# Patient Record
Sex: MALE | Race: WHITE | NOT HISPANIC OR LATINO | Employment: FULL TIME | URBAN - METROPOLITAN AREA
[De-identification: names, ages, dates, MRNs, and addresses within clinical notes are randomized per-mention and may not be internally consistent; named-entity substitution may affect disease eponyms.]

---

## 2021-06-22 ENCOUNTER — HOSPITAL ENCOUNTER (OUTPATIENT)
Facility: HOSPITAL | Age: 56
Discharge: HOME | End: 2021-06-23
Attending: EMERGENCY MEDICINE | Admitting: INTERNAL MEDICINE

## 2021-06-22 DIAGNOSIS — G45.9 TIA (TRANSIENT ISCHEMIC ATTACK): Primary | ICD-10-CM

## 2021-06-22 DIAGNOSIS — R20.2 PARESTHESIA: ICD-10-CM

## 2021-06-22 DIAGNOSIS — I63.9 STROKE: ICD-10-CM

## 2021-06-22 LAB
ALBUMIN SERPL BCP-MCNC: 3.7 G/DL (ref 3.5–5.2)
ALP SERPL-CCNC: 85 U/L (ref 55–135)
ALT SERPL W/O P-5'-P-CCNC: 9 U/L (ref 10–44)
ANION GAP SERPL CALC-SCNC: 10 MMOL/L (ref 8–16)
APTT PPP: 28.5 SEC (ref 25.6–35.8)
AST SERPL-CCNC: 16 U/L (ref 10–40)
BASOPHILS # BLD AUTO: 0.02 K/UL (ref 0–0.2)
BASOPHILS NFR BLD: 0.3 % (ref 0–1.9)
BILIRUB SERPL-MCNC: 0.7 MG/DL (ref 0.1–1)
BNP SERPL-MCNC: 137 PG/ML (ref 0–99)
BUN SERPL-MCNC: 14 MG/DL (ref 6–20)
CALCIUM SERPL-MCNC: 8 MG/DL (ref 8.7–10.5)
CHLORIDE SERPL-SCNC: 102 MMOL/L (ref 95–110)
CHOLEST SERPL-MCNC: 134 MG/DL (ref 120–199)
CHOLEST/HDLC SERPL: 4.6 {RATIO} (ref 2–5)
CO2 SERPL-SCNC: 26 MMOL/L (ref 23–29)
CREAT SERPL-MCNC: 1.1 MG/DL (ref 0.5–1.4)
CREAT SERPL-MCNC: 1.2 MG/DL (ref 0.5–1.4)
DIFFERENTIAL METHOD: ABNORMAL
EOSINOPHIL # BLD AUTO: 0.2 K/UL (ref 0–0.5)
EOSINOPHIL NFR BLD: 3.4 % (ref 0–8)
ERYTHROCYTE [DISTWIDTH] IN BLOOD BY AUTOMATED COUNT: 12.8 % (ref 11.5–14.5)
EST. GFR  (AFRICAN AMERICAN): >60 ML/MIN/1.73 M^2
EST. GFR  (NON AFRICAN AMERICAN): >60 ML/MIN/1.73 M^2
FOLATE SERPL-MCNC: 6 NG/ML (ref 4–24)
GLUCOSE SERPL-MCNC: 82 MG/DL (ref 70–110)
GLUCOSE SERPL-MCNC: 89 MG/DL (ref 70–110)
HCT VFR BLD AUTO: 41.2 % (ref 40–54)
HDLC SERPL-MCNC: 29 MG/DL (ref 40–75)
HDLC SERPL: 21.6 % (ref 20–50)
HGB BLD-MCNC: 13.8 G/DL (ref 14–18)
IMM GRANULOCYTES # BLD AUTO: 0.02 K/UL (ref 0–0.04)
IMM GRANULOCYTES NFR BLD AUTO: 0.3 % (ref 0–0.5)
INR PPP: 1
LDLC SERPL CALC-MCNC: 82.4 MG/DL (ref 63–159)
LYMPHOCYTES # BLD AUTO: 1.2 K/UL (ref 1–4.8)
LYMPHOCYTES NFR BLD: 17.1 % (ref 18–48)
MCH RBC QN AUTO: 29.4 PG (ref 27–31)
MCHC RBC AUTO-ENTMCNC: 33.5 G/DL (ref 32–36)
MCV RBC AUTO: 88 FL (ref 82–98)
MONOCYTES # BLD AUTO: 0.3 K/UL (ref 0.3–1)
MONOCYTES NFR BLD: 5.1 % (ref 4–15)
NEUTROPHILS # BLD AUTO: 5 K/UL (ref 1.8–7.7)
NEUTROPHILS NFR BLD: 73.8 % (ref 38–73)
NONHDLC SERPL-MCNC: 105 MG/DL
NRBC BLD-RTO: 0 /100 WBC
PLATELET # BLD AUTO: 155 K/UL (ref 150–450)
PMV BLD AUTO: 8.7 FL (ref 9.2–12.9)
POTASSIUM SERPL-SCNC: 3.8 MMOL/L (ref 3.5–5.1)
PROT SERPL-MCNC: 7.2 G/DL (ref 6–8.4)
PROTHROMBIN TIME: 12.8 SEC (ref 11.8–14.3)
RBC # BLD AUTO: 4.69 M/UL (ref 4.6–6.2)
SAMPLE: NORMAL
SARS-COV-2 RDRP RESP QL NAA+PROBE: NEGATIVE
SODIUM SERPL-SCNC: 138 MMOL/L (ref 136–145)
TRIGL SERPL-MCNC: 113 MG/DL (ref 30–150)
TROPONIN I SERPL DL<=0.01 NG/ML-MCNC: <0.03 NG/ML
TROPONIN I SERPL DL<=0.01 NG/ML-MCNC: <0.03 NG/ML
TSH SERPL DL<=0.005 MIU/L-ACNC: 1.54 UIU/ML (ref 0.34–5.6)
VIT B12 SERPL-MCNC: 91 PG/ML (ref 210–950)
WBC # BLD AUTO: 6.71 K/UL (ref 3.9–12.7)

## 2021-06-22 PROCEDURE — 84443 ASSAY THYROID STIM HORMONE: CPT | Performed by: EMERGENCY MEDICINE

## 2021-06-22 PROCEDURE — 36415 COLL VENOUS BLD VENIPUNCTURE: CPT | Performed by: NURSE PRACTITIONER

## 2021-06-22 PROCEDURE — 25000003 PHARM REV CODE 250: Performed by: INTERNAL MEDICINE

## 2021-06-22 PROCEDURE — 82607 VITAMIN B-12: CPT | Performed by: NURSE PRACTITIONER

## 2021-06-22 PROCEDURE — 80061 LIPID PANEL: CPT | Performed by: EMERGENCY MEDICINE

## 2021-06-22 PROCEDURE — G0378 HOSPITAL OBSERVATION PER HR: HCPCS

## 2021-06-22 PROCEDURE — 96375 TX/PRO/DX INJ NEW DRUG ADDON: CPT

## 2021-06-22 PROCEDURE — 84484 ASSAY OF TROPONIN QUANT: CPT | Mod: 91 | Performed by: EMERGENCY MEDICINE

## 2021-06-22 PROCEDURE — U0002 COVID-19 LAB TEST NON-CDC: HCPCS | Performed by: EMERGENCY MEDICINE

## 2021-06-22 PROCEDURE — 93010 EKG 12-LEAD: ICD-10-PCS | Mod: ,,, | Performed by: INTERNAL MEDICINE

## 2021-06-22 PROCEDURE — 99285 EMERGENCY DEPT VISIT HI MDM: CPT | Mod: 25

## 2021-06-22 PROCEDURE — 85610 PROTHROMBIN TIME: CPT | Performed by: EMERGENCY MEDICINE

## 2021-06-22 PROCEDURE — 63600175 PHARM REV CODE 636 W HCPCS: Performed by: NURSE PRACTITIONER

## 2021-06-22 PROCEDURE — 25000003 PHARM REV CODE 250: Performed by: EMERGENCY MEDICINE

## 2021-06-22 PROCEDURE — 93005 ELECTROCARDIOGRAM TRACING: CPT | Performed by: INTERNAL MEDICINE

## 2021-06-22 PROCEDURE — 84484 ASSAY OF TROPONIN QUANT: CPT | Performed by: NURSE PRACTITIONER

## 2021-06-22 PROCEDURE — 80053 COMPREHEN METABOLIC PANEL: CPT | Performed by: EMERGENCY MEDICINE

## 2021-06-22 PROCEDURE — 85025 COMPLETE CBC W/AUTO DIFF WBC: CPT | Performed by: EMERGENCY MEDICINE

## 2021-06-22 PROCEDURE — 82746 ASSAY OF FOLIC ACID SERUM: CPT | Performed by: NURSE PRACTITIONER

## 2021-06-22 PROCEDURE — 85730 THROMBOPLASTIN TIME PARTIAL: CPT | Performed by: EMERGENCY MEDICINE

## 2021-06-22 PROCEDURE — 96374 THER/PROPH/DIAG INJ IV PUSH: CPT

## 2021-06-22 PROCEDURE — 82962 GLUCOSE BLOOD TEST: CPT

## 2021-06-22 PROCEDURE — 83880 ASSAY OF NATRIURETIC PEPTIDE: CPT | Performed by: EMERGENCY MEDICINE

## 2021-06-22 PROCEDURE — 93010 ELECTROCARDIOGRAM REPORT: CPT | Mod: ,,, | Performed by: INTERNAL MEDICINE

## 2021-06-22 PROCEDURE — 25500020 PHARM REV CODE 255: Performed by: EMERGENCY MEDICINE

## 2021-06-22 RX ORDER — AMOXICILLIN 250 MG
2 CAPSULE ORAL DAILY PRN
Status: DISCONTINUED | OUTPATIENT
Start: 2021-06-22 | End: 2021-06-23 | Stop reason: HOSPADM

## 2021-06-22 RX ORDER — ONDANSETRON 2 MG/ML
4 INJECTION INTRAMUSCULAR; INTRAVENOUS EVERY 6 HOURS PRN
Status: DISCONTINUED | OUTPATIENT
Start: 2021-06-22 | End: 2021-06-23 | Stop reason: HOSPADM

## 2021-06-22 RX ORDER — POTASSIUM CHLORIDE 7.45 MG/ML
20 INJECTION INTRAVENOUS
Status: DISCONTINUED | OUTPATIENT
Start: 2021-06-22 | End: 2021-06-23 | Stop reason: HOSPADM

## 2021-06-22 RX ORDER — MAGNESIUM SULFATE HEPTAHYDRATE 40 MG/ML
4 INJECTION, SOLUTION INTRAVENOUS
Status: DISCONTINUED | OUTPATIENT
Start: 2021-06-22 | End: 2021-06-23 | Stop reason: HOSPADM

## 2021-06-22 RX ORDER — POTASSIUM CHLORIDE 20 MEQ/1
20 TABLET, EXTENDED RELEASE ORAL
Status: DISCONTINUED | OUTPATIENT
Start: 2021-06-22 | End: 2021-06-23 | Stop reason: HOSPADM

## 2021-06-22 RX ORDER — LABETALOL HYDROCHLORIDE 5 MG/ML
10 INJECTION, SOLUTION INTRAVENOUS
Status: COMPLETED | OUTPATIENT
Start: 2021-06-22 | End: 2021-06-22

## 2021-06-22 RX ORDER — SODIUM CHLORIDE 0.9 % (FLUSH) 0.9 %
10 SYRINGE (ML) INJECTION
Status: DISCONTINUED | OUTPATIENT
Start: 2021-06-22 | End: 2021-06-23 | Stop reason: HOSPADM

## 2021-06-22 RX ORDER — ATORVASTATIN CALCIUM 20 MG/1
40 TABLET, FILM COATED ORAL DAILY
Status: DISCONTINUED | OUTPATIENT
Start: 2021-06-23 | End: 2021-06-22

## 2021-06-22 RX ORDER — AMLODIPINE BESYLATE 5 MG/1
5 TABLET ORAL DAILY
Status: DISCONTINUED | OUTPATIENT
Start: 2021-06-22 | End: 2021-06-22

## 2021-06-22 RX ORDER — POTASSIUM CHLORIDE 20 MEQ/1
40 TABLET, EXTENDED RELEASE ORAL
Status: DISCONTINUED | OUTPATIENT
Start: 2021-06-22 | End: 2021-06-23 | Stop reason: HOSPADM

## 2021-06-22 RX ORDER — MAGNESIUM SULFATE HEPTAHYDRATE 40 MG/ML
2 INJECTION, SOLUTION INTRAVENOUS
Status: DISCONTINUED | OUTPATIENT
Start: 2021-06-22 | End: 2021-06-23 | Stop reason: HOSPADM

## 2021-06-22 RX ORDER — LANOLIN ALCOHOL/MO/W.PET/CERES
800 CREAM (GRAM) TOPICAL
Status: DISCONTINUED | OUTPATIENT
Start: 2021-06-22 | End: 2021-06-23 | Stop reason: HOSPADM

## 2021-06-22 RX ORDER — HYDRALAZINE HYDROCHLORIDE 20 MG/ML
10 INJECTION INTRAMUSCULAR; INTRAVENOUS EVERY 6 HOURS PRN
Status: DISCONTINUED | OUTPATIENT
Start: 2021-06-22 | End: 2021-06-23 | Stop reason: HOSPADM

## 2021-06-22 RX ORDER — MAGNESIUM SULFATE 1 G/100ML
1 INJECTION INTRAVENOUS
Status: DISCONTINUED | OUTPATIENT
Start: 2021-06-22 | End: 2021-06-23 | Stop reason: HOSPADM

## 2021-06-22 RX ORDER — ASPIRIN 325 MG
325 TABLET ORAL
Status: COMPLETED | OUTPATIENT
Start: 2021-06-22 | End: 2021-06-22

## 2021-06-22 RX ORDER — POTASSIUM CHLORIDE 7.45 MG/ML
40 INJECTION INTRAVENOUS
Status: DISCONTINUED | OUTPATIENT
Start: 2021-06-22 | End: 2021-06-23 | Stop reason: HOSPADM

## 2021-06-22 RX ORDER — NAPROXEN SODIUM 220 MG/1
81 TABLET, FILM COATED ORAL DAILY
Status: DISCONTINUED | OUTPATIENT
Start: 2021-06-23 | End: 2021-06-23 | Stop reason: HOSPADM

## 2021-06-22 RX ORDER — ATORVASTATIN CALCIUM 40 MG/1
40 TABLET, FILM COATED ORAL NIGHTLY
Status: DISCONTINUED | OUTPATIENT
Start: 2021-06-22 | End: 2021-06-23 | Stop reason: HOSPADM

## 2021-06-22 RX ORDER — ACETAMINOPHEN 325 MG/1
650 TABLET ORAL EVERY 6 HOURS PRN
Status: DISCONTINUED | OUTPATIENT
Start: 2021-06-22 | End: 2021-06-23 | Stop reason: HOSPADM

## 2021-06-22 RX ADMIN — ASPIRIN 325 MG ORAL TABLET 325 MG: 325 PILL ORAL at 06:06

## 2021-06-22 RX ADMIN — ATORVASTATIN CALCIUM 40 MG: 40 TABLET, FILM COATED ORAL at 09:06

## 2021-06-22 RX ADMIN — LABETALOL HYDROCHLORIDE 10 MG: 5 INJECTION, SOLUTION INTRAVENOUS at 06:06

## 2021-06-22 RX ADMIN — IOHEXOL 100 ML: 350 INJECTION, SOLUTION INTRAVENOUS at 04:06

## 2021-06-22 RX ADMIN — AMLODIPINE BESYLATE 5 MG: 5 TABLET ORAL at 09:06

## 2021-06-22 RX ADMIN — ONDANSETRON 4 MG: 2 INJECTION INTRAMUSCULAR; INTRAVENOUS at 08:06

## 2021-06-22 RX ADMIN — HYDRALAZINE HYDROCHLORIDE 10 MG: 20 INJECTION INTRAMUSCULAR; INTRAVENOUS at 08:06

## 2021-06-23 ENCOUNTER — CLINICAL SUPPORT (OUTPATIENT)
Dept: CARDIOLOGY | Facility: HOSPITAL | Age: 56
End: 2021-06-23
Attending: EMERGENCY MEDICINE

## 2021-06-23 VITALS
BODY MASS INDEX: 38.43 KG/M2 | OXYGEN SATURATION: 98 % | HEART RATE: 61 BPM | WEIGHT: 309.06 LBS | DIASTOLIC BLOOD PRESSURE: 69 MMHG | SYSTOLIC BLOOD PRESSURE: 165 MMHG | TEMPERATURE: 98 F | RESPIRATION RATE: 19 BRPM | HEIGHT: 75 IN

## 2021-06-23 LAB
ALBUMIN SERPL BCP-MCNC: 3.5 G/DL (ref 3.5–5.2)
ALP SERPL-CCNC: 86 U/L (ref 55–135)
ALT SERPL W/O P-5'-P-CCNC: 6 U/L (ref 10–44)
ANION GAP SERPL CALC-SCNC: 10 MMOL/L (ref 8–16)
AST SERPL-CCNC: 14 U/L (ref 10–40)
BASOPHILS # BLD AUTO: 0.03 K/UL (ref 0–0.2)
BASOPHILS NFR BLD: 0.4 % (ref 0–1.9)
BILIRUB SERPL-MCNC: 0.7 MG/DL (ref 0.1–1)
BUN SERPL-MCNC: 13 MG/DL (ref 6–20)
CALCIUM SERPL-MCNC: 7.9 MG/DL (ref 8.7–10.5)
CHLORIDE SERPL-SCNC: 102 MMOL/L (ref 95–110)
CO2 SERPL-SCNC: 28 MMOL/L (ref 23–29)
CREAT SERPL-MCNC: 1.2 MG/DL (ref 0.5–1.4)
DIFFERENTIAL METHOD: ABNORMAL
EOSINOPHIL # BLD AUTO: 0.2 K/UL (ref 0–0.5)
EOSINOPHIL NFR BLD: 3.5 % (ref 0–8)
ERYTHROCYTE [DISTWIDTH] IN BLOOD BY AUTOMATED COUNT: 12.7 % (ref 11.5–14.5)
EST. GFR  (AFRICAN AMERICAN): >60 ML/MIN/1.73 M^2
EST. GFR  (NON AFRICAN AMERICAN): >60 ML/MIN/1.73 M^2
ESTIMATED AVG GLUCOSE: 111 MG/DL (ref 68–131)
GLUCOSE SERPL-MCNC: 89 MG/DL (ref 70–110)
HBA1C MFR BLD: 5.5 % (ref 4.5–6.2)
HCT VFR BLD AUTO: 42.3 % (ref 40–54)
HGB BLD-MCNC: 14.1 G/DL (ref 14–18)
IMM GRANULOCYTES # BLD AUTO: 0.02 K/UL (ref 0–0.04)
IMM GRANULOCYTES NFR BLD AUTO: 0.3 % (ref 0–0.5)
LYMPHOCYTES # BLD AUTO: 1.7 K/UL (ref 1–4.8)
LYMPHOCYTES NFR BLD: 24.7 % (ref 18–48)
MAGNESIUM SERPL-MCNC: 2.1 MG/DL (ref 1.6–2.6)
MCH RBC QN AUTO: 29.4 PG (ref 27–31)
MCHC RBC AUTO-ENTMCNC: 33.3 G/DL (ref 32–36)
MCV RBC AUTO: 88 FL (ref 82–98)
MONOCYTES # BLD AUTO: 0.4 K/UL (ref 0.3–1)
MONOCYTES NFR BLD: 5.2 % (ref 4–15)
NEUTROPHILS # BLD AUTO: 4.6 K/UL (ref 1.8–7.7)
NEUTROPHILS NFR BLD: 65.9 % (ref 38–73)
NRBC BLD-RTO: 0 /100 WBC
PHOSPHATE SERPL-MCNC: 3.6 MG/DL (ref 2.7–4.5)
PLATELET # BLD AUTO: 157 K/UL (ref 150–450)
PMV BLD AUTO: 8.7 FL (ref 9.2–12.9)
POTASSIUM SERPL-SCNC: 3.6 MMOL/L (ref 3.5–5.1)
PROT SERPL-MCNC: 7.1 G/DL (ref 6–8.4)
RBC # BLD AUTO: 4.8 M/UL (ref 4.6–6.2)
SODIUM SERPL-SCNC: 140 MMOL/L (ref 136–145)
TROPONIN I SERPL DL<=0.01 NG/ML-MCNC: <0.03 NG/ML
WBC # BLD AUTO: 6.93 K/UL (ref 3.9–12.7)

## 2021-06-23 PROCEDURE — 84100 ASSAY OF PHOSPHORUS: CPT | Performed by: NURSE PRACTITIONER

## 2021-06-23 PROCEDURE — 93306 TTE W/DOPPLER COMPLETE: CPT | Mod: 26,,, | Performed by: INTERNAL MEDICINE

## 2021-06-23 PROCEDURE — 36415 COLL VENOUS BLD VENIPUNCTURE: CPT | Performed by: NURSE PRACTITIONER

## 2021-06-23 PROCEDURE — 63600175 PHARM REV CODE 636 W HCPCS: Performed by: INTERNAL MEDICINE

## 2021-06-23 PROCEDURE — 96372 THER/PROPH/DIAG INJ SC/IM: CPT

## 2021-06-23 PROCEDURE — 83036 HEMOGLOBIN GLYCOSYLATED A1C: CPT | Performed by: NURSE PRACTITIONER

## 2021-06-23 PROCEDURE — 92610 EVALUATE SWALLOWING FUNCTION: CPT

## 2021-06-23 PROCEDURE — 63600175 PHARM REV CODE 636 W HCPCS: Performed by: NURSE PRACTITIONER

## 2021-06-23 PROCEDURE — 84484 ASSAY OF TROPONIN QUANT: CPT | Performed by: NURSE PRACTITIONER

## 2021-06-23 PROCEDURE — 25000003 PHARM REV CODE 250: Performed by: NURSE PRACTITIONER

## 2021-06-23 PROCEDURE — 93306 TTE W/DOPPLER COMPLETE: CPT

## 2021-06-23 PROCEDURE — G0378 HOSPITAL OBSERVATION PER HR: HCPCS

## 2021-06-23 PROCEDURE — 25000003 PHARM REV CODE 250: Performed by: INTERNAL MEDICINE

## 2021-06-23 PROCEDURE — 96376 TX/PRO/DX INJ SAME DRUG ADON: CPT

## 2021-06-23 PROCEDURE — 93306 ECHO (CUPID ONLY): ICD-10-PCS | Mod: 26,,, | Performed by: INTERNAL MEDICINE

## 2021-06-23 PROCEDURE — 85025 COMPLETE CBC W/AUTO DIFF WBC: CPT | Performed by: NURSE PRACTITIONER

## 2021-06-23 PROCEDURE — 80053 COMPREHEN METABOLIC PANEL: CPT | Performed by: NURSE PRACTITIONER

## 2021-06-23 PROCEDURE — 83735 ASSAY OF MAGNESIUM: CPT | Performed by: NURSE PRACTITIONER

## 2021-06-23 PROCEDURE — 97161 PT EVAL LOW COMPLEX 20 MIN: CPT

## 2021-06-23 RX ORDER — ATORVASTATIN CALCIUM 40 MG/1
40 TABLET, FILM COATED ORAL NIGHTLY
Qty: 90 TABLET | Refills: 0 | Status: SHIPPED | OUTPATIENT
Start: 2021-06-23 | End: 2022-06-23

## 2021-06-23 RX ORDER — AMLODIPINE BESYLATE 5 MG/1
10 TABLET ORAL DAILY
Status: DISCONTINUED | OUTPATIENT
Start: 2021-06-23 | End: 2021-06-23 | Stop reason: HOSPADM

## 2021-06-23 RX ORDER — CLONIDINE HYDROCHLORIDE 0.1 MG/1
0.1 TABLET ORAL ONCE
Status: COMPLETED | OUTPATIENT
Start: 2021-06-23 | End: 2021-06-23

## 2021-06-23 RX ORDER — AMLODIPINE BESYLATE 10 MG/1
10 TABLET ORAL DAILY
Qty: 30 TABLET | Refills: 3 | Status: SHIPPED | OUTPATIENT
Start: 2021-06-23 | End: 2021-10-21

## 2021-06-23 RX ORDER — CYANOCOBALAMIN 1000 UG/ML
1000 INJECTION, SOLUTION INTRAMUSCULAR; SUBCUTANEOUS ONCE
Status: COMPLETED | OUTPATIENT
Start: 2021-06-23 | End: 2021-06-23

## 2021-06-23 RX ORDER — NAPROXEN SODIUM 220 MG/1
81 TABLET, FILM COATED ORAL DAILY
Qty: 30 TABLET | Refills: 3 | Status: SHIPPED | OUTPATIENT
Start: 2021-06-23 | End: 2021-10-21

## 2021-06-23 RX ORDER — AMLODIPINE BESYLATE 5 MG/1
5 TABLET ORAL DAILY
Status: DISCONTINUED | OUTPATIENT
Start: 2021-06-23 | End: 2021-06-23

## 2021-06-23 RX ADMIN — POTASSIUM CHLORIDE 20 MEQ: 20 TABLET, EXTENDED RELEASE ORAL at 06:06

## 2021-06-23 RX ADMIN — CYANOCOBALAMIN 1000 MCG: 1000 INJECTION, SOLUTION INTRAMUSCULAR at 08:06

## 2021-06-23 RX ADMIN — HYDRALAZINE HYDROCHLORIDE 10 MG: 20 INJECTION INTRAMUSCULAR; INTRAVENOUS at 08:06

## 2021-06-23 RX ADMIN — CLONIDINE HYDROCHLORIDE 0.1 MG: 0.1 TABLET ORAL at 01:06

## 2021-06-23 RX ADMIN — ASPIRIN 81 MG: 81 TABLET, CHEWABLE ORAL at 08:06

## 2021-06-23 RX ADMIN — AMLODIPINE BESYLATE 10 MG: 5 TABLET ORAL at 01:06

## 2021-06-24 LAB
AORTIC ROOT ANNULUS: 3.21 CM
AORTIC VALVE CUSP SEPERATION: 2.11 CM
AV INDEX (PROSTH): 0.78
AV MEAN GRADIENT: 5 MMHG
AV PEAK GRADIENT: 10 MMHG
AV VALVE AREA: 3.48 CM2
AV VELOCITY RATIO: 73.64
BSA FOR ECHO PROCEDURE: 2.72 M2
CV ECHO LV RWT: 0.51 CM
DOP CALC AO PEAK VEL: 1.6 M/S
DOP CALC AO VTI: 34.8 CM
DOP CALC LVOT AREA: 4.4 CM2
DOP CALC LVOT DIAMETER: 2.38 CM
DOP CALC LVOT PEAK VEL: 117.82 M/S
DOP CALC LVOT STROKE VOLUME: 121.17 CM3
DOP CALCLVOT PEAK VEL VTI: 27.25 CM
E WAVE DECELERATION TIME: 281.78 MSEC
E/A RATIO: 1.06
E/E' RATIO: 12.5 M/S
ECHO LV POSTERIOR WALL: 1.28 CM (ref 0.6–1.1)
EJECTION FRACTION: 75 %
FRACTIONAL SHORTENING: 45 % (ref 28–44)
INTERVENTRICULAR SEPTUM: 1.11 CM (ref 0.6–1.1)
LEFT ATRIUM SIZE: 4.04 CM
LEFT INTERNAL DIMENSION IN SYSTOLE: 2.79 CM (ref 2.1–4)
LEFT VENTRICLE MASS INDEX: 90 G/M2
LEFT VENTRICULAR INTERNAL DIMENSION IN DIASTOLE: 5.06 CM (ref 3.5–6)
LEFT VENTRICULAR MASS: 236.84 G
LV LATERAL E/E' RATIO: 15 M/S
LV SEPTAL E/E' RATIO: 10.71 M/S
MV PEAK A VEL: 0.71 M/S
MV PEAK E VEL: 0.75 M/S
PV PEAK VELOCITY: 97.78 CM/S
RA PRESSURE: 3 MMHG
RIGHT VENTRICULAR END-DIASTOLIC DIMENSION: 404 CM
TDI LATERAL: 0.05 M/S
TDI SEPTAL: 0.07 M/S
TDI: 0.06 M/S

## 2021-12-09 ENCOUNTER — HOSPITAL ENCOUNTER (OUTPATIENT)
Facility: HOSPITAL | Age: 56
Discharge: HOME | End: 2021-12-10
Attending: EMERGENCY MEDICINE | Admitting: STUDENT IN AN ORGANIZED HEALTH CARE EDUCATION/TRAINING PROGRAM

## 2021-12-09 ENCOUNTER — ANESTHESIA EVENT (OUTPATIENT)
Dept: SURGERY | Facility: HOSPITAL | Age: 56
End: 2021-12-09

## 2021-12-09 ENCOUNTER — ANESTHESIA (OUTPATIENT)
Dept: SURGERY | Facility: HOSPITAL | Age: 56
End: 2021-12-09

## 2021-12-09 DIAGNOSIS — L02.91 CUTANEOUS ABSCESS, UNSPECIFIED SITE: ICD-10-CM

## 2021-12-09 DIAGNOSIS — R60.9 SWELLING: ICD-10-CM

## 2021-12-09 DIAGNOSIS — L02.415 ABSCESS OF RIGHT THIGH: Primary | ICD-10-CM

## 2021-12-09 PROBLEM — L02.419 ABSCESS OF LEG: Status: ACTIVE | Noted: 2021-12-09

## 2021-12-09 LAB
ALBUMIN SERPL BCP-MCNC: 3.1 G/DL (ref 3.5–5.2)
ALP SERPL-CCNC: 88 U/L (ref 55–135)
ALT SERPL W/O P-5'-P-CCNC: 9 U/L (ref 10–44)
ANION GAP SERPL CALC-SCNC: 12 MMOL/L (ref 8–16)
AST SERPL-CCNC: 15 U/L (ref 10–40)
BASOPHILS # BLD AUTO: 0.02 K/UL (ref 0–0.2)
BASOPHILS NFR BLD: 0.2 % (ref 0–1.9)
BILIRUB SERPL-MCNC: 0.8 MG/DL (ref 0.1–1)
BUN SERPL-MCNC: 14 MG/DL (ref 6–20)
CALCIUM SERPL-MCNC: 8 MG/DL (ref 8.7–10.5)
CHLORIDE SERPL-SCNC: 95 MMOL/L (ref 95–110)
CO2 SERPL-SCNC: 23 MMOL/L (ref 23–29)
CREAT SERPL-MCNC: 1.4 MG/DL (ref 0.5–1.4)
CRP SERPL-MCNC: >38 MG/DL
DIFFERENTIAL METHOD: ABNORMAL
EOSINOPHIL # BLD AUTO: 0.1 K/UL (ref 0–0.5)
EOSINOPHIL NFR BLD: 0.9 % (ref 0–8)
ERYTHROCYTE [DISTWIDTH] IN BLOOD BY AUTOMATED COUNT: 13.5 % (ref 11.5–14.5)
ERYTHROCYTE [SEDIMENTATION RATE] IN BLOOD BY WESTERGREN METHOD: 62 MM/HR (ref 0–10)
EST. GFR  (AFRICAN AMERICAN): >60 ML/MIN/1.73 M^2
EST. GFR  (NON AFRICAN AMERICAN): 55.8 ML/MIN/1.73 M^2
GLUCOSE SERPL-MCNC: 95 MG/DL (ref 70–110)
HCT VFR BLD AUTO: 41.9 % (ref 40–54)
HGB BLD-MCNC: 14 G/DL (ref 14–18)
IMM GRANULOCYTES # BLD AUTO: 0.1 K/UL (ref 0–0.04)
IMM GRANULOCYTES NFR BLD AUTO: 1.1 % (ref 0–0.5)
LACTATE SERPL-SCNC: 1.7 MMOL/L (ref 0.5–1.9)
LYMPHOCYTES # BLD AUTO: 0.5 K/UL (ref 1–4.8)
LYMPHOCYTES NFR BLD: 5.4 % (ref 18–48)
MCH RBC QN AUTO: 29 PG (ref 27–31)
MCHC RBC AUTO-ENTMCNC: 33.4 G/DL (ref 32–36)
MCV RBC AUTO: 87 FL (ref 82–98)
MONOCYTES # BLD AUTO: 0.4 K/UL (ref 0.3–1)
MONOCYTES NFR BLD: 4.1 % (ref 4–15)
NEUTROPHILS # BLD AUTO: 8.4 K/UL (ref 1.8–7.7)
NEUTROPHILS NFR BLD: 88.3 % (ref 38–73)
NRBC BLD-RTO: 0 /100 WBC
PLATELET # BLD AUTO: 275 K/UL (ref 150–450)
PMV BLD AUTO: 11.6 FL (ref 9.2–12.9)
POTASSIUM SERPL-SCNC: 4 MMOL/L (ref 3.5–5.1)
PROT SERPL-MCNC: 7.9 G/DL (ref 6–8.4)
RBC # BLD AUTO: 4.82 M/UL (ref 4.6–6.2)
SARS-COV-2 RDRP RESP QL NAA+PROBE: POSITIVE
SODIUM SERPL-SCNC: 130 MMOL/L (ref 136–145)
WBC # BLD AUTO: 9.5 K/UL (ref 3.9–12.7)

## 2021-12-09 PROCEDURE — 25000003 PHARM REV CODE 250: Performed by: EMERGENCY MEDICINE

## 2021-12-09 PROCEDURE — 63600175 PHARM REV CODE 636 W HCPCS: Performed by: EMERGENCY MEDICINE

## 2021-12-09 PROCEDURE — 87147 CULTURE TYPE IMMUNOLOGIC: CPT | Mod: 59 | Performed by: EMERGENCY MEDICINE

## 2021-12-09 PROCEDURE — 96361 HYDRATE IV INFUSION ADD-ON: CPT

## 2021-12-09 PROCEDURE — 99900035 HC TECH TIME PER 15 MIN (STAT)

## 2021-12-09 PROCEDURE — 87070 CULTURE OTHR SPECIMN AEROBIC: CPT | Performed by: EMERGENCY MEDICINE

## 2021-12-09 PROCEDURE — 10061 PR DRAIN SKIN ABSCESS COMPLIC: ICD-10-PCS | Mod: ,,, | Performed by: SURGERY

## 2021-12-09 PROCEDURE — 86140 C-REACTIVE PROTEIN: CPT | Performed by: NURSE PRACTITIONER

## 2021-12-09 PROCEDURE — G0378 HOSPITAL OBSERVATION PER HR: HCPCS

## 2021-12-09 PROCEDURE — 87186 SC STD MICRODIL/AGAR DIL: CPT | Performed by: EMERGENCY MEDICINE

## 2021-12-09 PROCEDURE — 85025 COMPLETE CBC W/AUTO DIFF WBC: CPT | Performed by: NURSE PRACTITIONER

## 2021-12-09 PROCEDURE — 25000003 PHARM REV CODE 250: Performed by: STUDENT IN AN ORGANIZED HEALTH CARE EDUCATION/TRAINING PROGRAM

## 2021-12-09 PROCEDURE — 87040 BLOOD CULTURE FOR BACTERIA: CPT | Performed by: NURSE PRACTITIONER

## 2021-12-09 PROCEDURE — 63600175 PHARM REV CODE 636 W HCPCS: Performed by: STUDENT IN AN ORGANIZED HEALTH CARE EDUCATION/TRAINING PROGRAM

## 2021-12-09 PROCEDURE — 94761 N-INVAS EAR/PLS OXIMETRY MLT: CPT

## 2021-12-09 PROCEDURE — 99285 EMERGENCY DEPT VISIT HI MDM: CPT | Mod: 25

## 2021-12-09 PROCEDURE — 71000033 HC RECOVERY, INTIAL HOUR: Performed by: SURGERY

## 2021-12-09 PROCEDURE — 94799 UNLISTED PULMONARY SVC/PX: CPT

## 2021-12-09 PROCEDURE — 25000003 PHARM REV CODE 250: Performed by: SURGERY

## 2021-12-09 PROCEDURE — M0243 CASIRIVI AND IMDEVI INFUSION: HCPCS | Performed by: EMERGENCY MEDICINE

## 2021-12-09 PROCEDURE — 37000009 HC ANESTHESIA EA ADD 15 MINS: Performed by: SURGERY

## 2021-12-09 PROCEDURE — 37000008 HC ANESTHESIA 1ST 15 MINUTES: Performed by: SURGERY

## 2021-12-09 PROCEDURE — 10061 I&D ABSCESS COMP/MULTIPLE: CPT | Mod: ,,, | Performed by: SURGERY

## 2021-12-09 PROCEDURE — 83605 ASSAY OF LACTIC ACID: CPT | Performed by: NURSE PRACTITIONER

## 2021-12-09 PROCEDURE — 96376 TX/PRO/DX INJ SAME DRUG ADON: CPT

## 2021-12-09 PROCEDURE — 96365 THER/PROPH/DIAG IV INF INIT: CPT

## 2021-12-09 PROCEDURE — 85651 RBC SED RATE NONAUTOMATED: CPT | Performed by: NURSE PRACTITIONER

## 2021-12-09 PROCEDURE — 80053 COMPREHEN METABOLIC PANEL: CPT | Performed by: NURSE PRACTITIONER

## 2021-12-09 PROCEDURE — 96367 TX/PROPH/DG ADDL SEQ IV INF: CPT

## 2021-12-09 PROCEDURE — 36000704 HC OR TIME LEV I 1ST 15 MIN: Performed by: SURGERY

## 2021-12-09 PROCEDURE — 25000003 PHARM REV CODE 250: Performed by: NURSE PRACTITIONER

## 2021-12-09 PROCEDURE — 27000080 OPTIME MED/SURG SUP & DEVICES GENERAL CLASSIFICATION: Performed by: SURGERY

## 2021-12-09 PROCEDURE — 99203 OFFICE O/P NEW LOW 30 MIN: CPT | Mod: 25,,, | Performed by: SURGERY

## 2021-12-09 PROCEDURE — 36000705 HC OR TIME LEV I EA ADD 15 MIN: Performed by: SURGERY

## 2021-12-09 PROCEDURE — 87075 CULTR BACTERIA EXCEPT BLOOD: CPT | Performed by: EMERGENCY MEDICINE

## 2021-12-09 PROCEDURE — 96375 TX/PRO/DX INJ NEW DRUG ADDON: CPT

## 2021-12-09 PROCEDURE — 36415 COLL VENOUS BLD VENIPUNCTURE: CPT | Performed by: NURSE PRACTITIONER

## 2021-12-09 PROCEDURE — 99203 PR OFFICE/OUTPT VISIT, NEW, LEVL III, 30-44 MIN: ICD-10-PCS | Mod: 25,,, | Performed by: SURGERY

## 2021-12-09 PROCEDURE — 99900031 HC PATIENT EDUCATION (STAT)

## 2021-12-09 PROCEDURE — 87077 CULTURE AEROBIC IDENTIFY: CPT | Mod: 59 | Performed by: EMERGENCY MEDICINE

## 2021-12-09 PROCEDURE — U0002 COVID-19 LAB TEST NON-CDC: HCPCS | Performed by: EMERGENCY MEDICINE

## 2021-12-09 RX ORDER — OXYCODONE HYDROCHLORIDE 5 MG/1
5 TABLET ORAL
Status: DISCONTINUED | OUTPATIENT
Start: 2021-12-09 | End: 2021-12-10 | Stop reason: HOSPADM

## 2021-12-09 RX ORDER — BUPIVACAINE HCL/EPINEPHRINE 0.25-.0005
VIAL (ML) INJECTION
Status: DISCONTINUED | OUTPATIENT
Start: 2021-12-09 | End: 2021-12-09 | Stop reason: HOSPADM

## 2021-12-09 RX ORDER — SODIUM CHLORIDE 0.9 % (FLUSH) 0.9 %
10 SYRINGE (ML) INJECTION
Status: DISCONTINUED | OUTPATIENT
Start: 2021-12-09 | End: 2021-12-09

## 2021-12-09 RX ORDER — SODIUM CHLORIDE 0.9 % (FLUSH) 0.9 %
10 SYRINGE (ML) INJECTION
Status: DISCONTINUED | OUTPATIENT
Start: 2021-12-09 | End: 2021-12-10 | Stop reason: HOSPADM

## 2021-12-09 RX ORDER — HYDROMORPHONE HYDROCHLORIDE 1 MG/ML
1 INJECTION, SOLUTION INTRAMUSCULAR; INTRAVENOUS; SUBCUTANEOUS EVERY 4 HOURS PRN
Status: DISCONTINUED | OUTPATIENT
Start: 2021-12-09 | End: 2021-12-10 | Stop reason: HOSPADM

## 2021-12-09 RX ORDER — ACETAMINOPHEN 500 MG
1000 TABLET ORAL
Status: COMPLETED | OUTPATIENT
Start: 2021-12-09 | End: 2021-12-09

## 2021-12-09 RX ORDER — ONDANSETRON 4 MG/1
8 TABLET, ORALLY DISINTEGRATING ORAL EVERY 8 HOURS PRN
Status: DISCONTINUED | OUTPATIENT
Start: 2021-12-09 | End: 2021-12-10 | Stop reason: HOSPADM

## 2021-12-09 RX ORDER — SODIUM CHLORIDE 9 MG/ML
INJECTION, SOLUTION INTRAVENOUS CONTINUOUS
Status: DISCONTINUED | OUTPATIENT
Start: 2021-12-09 | End: 2021-12-10 | Stop reason: HOSPADM

## 2021-12-09 RX ORDER — SUCCINYLCHOLINE CHLORIDE 20 MG/ML
INJECTION INTRAMUSCULAR; INTRAVENOUS
Status: DISCONTINUED | OUTPATIENT
Start: 2021-12-09 | End: 2021-12-09

## 2021-12-09 RX ORDER — VANCOMYCIN HCL IN 5 % DEXTROSE 1G/250ML
1000 PLASTIC BAG, INJECTION (ML) INTRAVENOUS ONCE
Status: DISCONTINUED | OUTPATIENT
Start: 2021-12-09 | End: 2021-12-10 | Stop reason: HOSPADM

## 2021-12-09 RX ORDER — MIDAZOLAM HYDROCHLORIDE 1 MG/ML
INJECTION INTRAMUSCULAR; INTRAVENOUS
Status: DISCONTINUED | OUTPATIENT
Start: 2021-12-09 | End: 2021-12-09

## 2021-12-09 RX ORDER — HYDRALAZINE HYDROCHLORIDE 20 MG/ML
10 INJECTION INTRAMUSCULAR; INTRAVENOUS EVERY 6 HOURS PRN
Status: DISCONTINUED | OUTPATIENT
Start: 2021-12-09 | End: 2021-12-10 | Stop reason: HOSPADM

## 2021-12-09 RX ORDER — PROPOFOL 10 MG/ML
VIAL (ML) INTRAVENOUS
Status: DISCONTINUED | OUTPATIENT
Start: 2021-12-09 | End: 2021-12-09

## 2021-12-09 RX ORDER — HYDROMORPHONE HYDROCHLORIDE 1 MG/ML
0.2 INJECTION, SOLUTION INTRAMUSCULAR; INTRAVENOUS; SUBCUTANEOUS EVERY 5 MIN PRN
Status: DISCONTINUED | OUTPATIENT
Start: 2021-12-09 | End: 2021-12-10 | Stop reason: HOSPADM

## 2021-12-09 RX ORDER — TALC
6 POWDER (GRAM) TOPICAL NIGHTLY PRN
Status: DISCONTINUED | OUTPATIENT
Start: 2021-12-09 | End: 2021-12-10 | Stop reason: HOSPADM

## 2021-12-09 RX ORDER — HYDROCODONE BITARTRATE AND ACETAMINOPHEN 5; 325 MG/1; MG/1
1 TABLET ORAL EVERY 4 HOURS PRN
Status: DISCONTINUED | OUTPATIENT
Start: 2021-12-09 | End: 2021-12-10 | Stop reason: HOSPADM

## 2021-12-09 RX ORDER — SODIUM CHLORIDE, SODIUM LACTATE, POTASSIUM CHLORIDE, CALCIUM CHLORIDE 600; 310; 30; 20 MG/100ML; MG/100ML; MG/100ML; MG/100ML
INJECTION, SOLUTION INTRAVENOUS CONTINUOUS
Status: ACTIVE | OUTPATIENT
Start: 2021-12-09 | End: 2021-12-10

## 2021-12-09 RX ORDER — POLYETHYLENE GLYCOL 3350 17 G/17G
17 POWDER, FOR SOLUTION ORAL 2 TIMES DAILY PRN
Status: DISCONTINUED | OUTPATIENT
Start: 2021-12-09 | End: 2021-12-10 | Stop reason: HOSPADM

## 2021-12-09 RX ORDER — MUPIROCIN 20 MG/G
1 OINTMENT TOPICAL 2 TIMES DAILY
Status: DISCONTINUED | OUTPATIENT
Start: 2021-12-09 | End: 2021-12-10 | Stop reason: HOSPADM

## 2021-12-09 RX ORDER — CALCIUM CARBONATE 200(500)MG
500 TABLET,CHEWABLE ORAL 3 TIMES DAILY PRN
Status: DISCONTINUED | OUTPATIENT
Start: 2021-12-09 | End: 2021-12-10 | Stop reason: HOSPADM

## 2021-12-09 RX ORDER — FENTANYL CITRATE 50 UG/ML
INJECTION, SOLUTION INTRAMUSCULAR; INTRAVENOUS
Status: DISCONTINUED | OUTPATIENT
Start: 2021-12-09 | End: 2021-12-09

## 2021-12-09 RX ORDER — ONDANSETRON 2 MG/ML
INJECTION INTRAMUSCULAR; INTRAVENOUS
Status: DISCONTINUED | OUTPATIENT
Start: 2021-12-09 | End: 2021-12-09

## 2021-12-09 RX ORDER — SODIUM CHLORIDE, SODIUM LACTATE, POTASSIUM CHLORIDE, CALCIUM CHLORIDE 600; 310; 30; 20 MG/100ML; MG/100ML; MG/100ML; MG/100ML
INJECTION, SOLUTION INTRAVENOUS CONTINUOUS PRN
Status: DISCONTINUED | OUTPATIENT
Start: 2021-12-09 | End: 2021-12-09

## 2021-12-09 RX ORDER — NAPROXEN SODIUM 220 MG/1
81 TABLET, FILM COATED ORAL DAILY
Status: DISCONTINUED | OUTPATIENT
Start: 2021-12-10 | End: 2021-12-10 | Stop reason: HOSPADM

## 2021-12-09 RX ORDER — MEPERIDINE HYDROCHLORIDE 50 MG/ML
12.5 INJECTION INTRAMUSCULAR; INTRAVENOUS; SUBCUTANEOUS EVERY 10 MIN PRN
Status: ACTIVE | OUTPATIENT
Start: 2021-12-09 | End: 2021-12-09

## 2021-12-09 RX ORDER — ROCURONIUM BROMIDE 10 MG/ML
INJECTION, SOLUTION INTRAVENOUS
Status: DISCONTINUED | OUTPATIENT
Start: 2021-12-09 | End: 2021-12-09

## 2021-12-09 RX ORDER — ONDANSETRON 2 MG/ML
4 INJECTION INTRAMUSCULAR; INTRAVENOUS EVERY 12 HOURS PRN
Status: DISCONTINUED | OUTPATIENT
Start: 2021-12-09 | End: 2021-12-10 | Stop reason: HOSPADM

## 2021-12-09 RX ORDER — VANCOMYCIN HCL IN 5 % DEXTROSE 1G/250ML
1000 PLASTIC BAG, INJECTION (ML) INTRAVENOUS ONCE
Status: COMPLETED | OUTPATIENT
Start: 2021-12-09 | End: 2021-12-09

## 2021-12-09 RX ORDER — ATORVASTATIN CALCIUM 20 MG/1
40 TABLET, FILM COATED ORAL NIGHTLY
Status: DISCONTINUED | OUTPATIENT
Start: 2021-12-09 | End: 2021-12-10 | Stop reason: HOSPADM

## 2021-12-09 RX ORDER — LIDOCAINE HYDROCHLORIDE 20 MG/ML
INJECTION, SOLUTION EPIDURAL; INFILTRATION; INTRACAUDAL; PERINEURAL
Status: DISCONTINUED | OUTPATIENT
Start: 2021-12-09 | End: 2021-12-09

## 2021-12-09 RX ORDER — ONDANSETRON 2 MG/ML
4 INJECTION INTRAMUSCULAR; INTRAVENOUS DAILY PRN
Status: DISCONTINUED | OUTPATIENT
Start: 2021-12-09 | End: 2021-12-10 | Stop reason: HOSPADM

## 2021-12-09 RX ORDER — HYDROCODONE BITARTRATE AND ACETAMINOPHEN 5; 325 MG/1; MG/1
1 TABLET ORAL EVERY 4 HOURS PRN
Status: DISCONTINUED | OUTPATIENT
Start: 2021-12-09 | End: 2021-12-09

## 2021-12-09 RX ORDER — DIPHENHYDRAMINE HYDROCHLORIDE 50 MG/ML
12.5 INJECTION INTRAMUSCULAR; INTRAVENOUS
Status: DISCONTINUED | OUTPATIENT
Start: 2021-12-09 | End: 2021-12-10 | Stop reason: HOSPADM

## 2021-12-09 RX ORDER — AMLODIPINE BESYLATE 5 MG/1
10 TABLET ORAL DAILY
Status: DISCONTINUED | OUTPATIENT
Start: 2021-12-10 | End: 2021-12-10 | Stop reason: HOSPADM

## 2021-12-09 RX ORDER — FAMOTIDINE 10 MG/ML
INJECTION INTRAVENOUS
Status: DISCONTINUED | OUTPATIENT
Start: 2021-12-09 | End: 2021-12-09

## 2021-12-09 RX ORDER — LIDOCAINE HYDROCHLORIDE 20 MG/ML
JELLY TOPICAL
Status: DISCONTINUED | OUTPATIENT
Start: 2021-12-09 | End: 2021-12-09

## 2021-12-09 RX ADMIN — FAMOTIDINE 20 MG: 10 INJECTION, SOLUTION INTRAVENOUS at 04:12

## 2021-12-09 RX ADMIN — CEFTRIAXONE SODIUM 1 G: 1 INJECTION, POWDER, FOR SOLUTION INTRAMUSCULAR; INTRAVENOUS at 09:12

## 2021-12-09 RX ADMIN — ROCURONIUM BROMIDE 5 MG: 10 INJECTION, SOLUTION INTRAVENOUS at 04:12

## 2021-12-09 RX ADMIN — VANCOMYCIN HYDROCHLORIDE 1000 MG: 1 INJECTION, POWDER, LYOPHILIZED, FOR SOLUTION INTRAVENOUS at 04:12

## 2021-12-09 RX ADMIN — FENTANYL CITRATE 100 MCG: 50 INJECTION INTRAMUSCULAR; INTRAVENOUS at 04:12

## 2021-12-09 RX ADMIN — PIPERACILLIN AND TAZOBACTAM 4.5 G: 4; .5 INJECTION, POWDER, LYOPHILIZED, FOR SOLUTION INTRAVENOUS; PARENTERAL at 02:12

## 2021-12-09 RX ADMIN — LIDOCAINE HYDROCHLORIDE 100 MG: 20 INJECTION, SOLUTION INTRAVENOUS at 04:12

## 2021-12-09 RX ADMIN — SODIUM CHLORIDE, SODIUM LACTATE, POTASSIUM CHLORIDE, AND CALCIUM CHLORIDE 1000 ML: .6; .31; .03; .02 INJECTION, SOLUTION INTRAVENOUS at 03:12

## 2021-12-09 RX ADMIN — ONDANSETRON 4 MG: 2 INJECTION INTRAMUSCULAR; INTRAVENOUS at 04:12

## 2021-12-09 RX ADMIN — MUPIROCIN 1 G: 20 OINTMENT TOPICAL at 09:12

## 2021-12-09 RX ADMIN — MIDAZOLAM HYDROCHLORIDE 2 MG: 1 INJECTION, SOLUTION INTRAMUSCULAR; INTRAVENOUS at 04:12

## 2021-12-09 RX ADMIN — SUCCINYLCHOLINE CHLORIDE 160 MG: 20 INJECTION, SOLUTION INTRAMUSCULAR; INTRAVENOUS at 04:12

## 2021-12-09 RX ADMIN — OXYCODONE HYDROCHLORIDE 5 MG: 5 TABLET ORAL at 07:12

## 2021-12-09 RX ADMIN — SODIUM CHLORIDE: 0.9 INJECTION, SOLUTION INTRAVENOUS at 06:12

## 2021-12-09 RX ADMIN — PROPOFOL 150 MG: 10 INJECTION, EMULSION INTRAVENOUS at 04:12

## 2021-12-09 RX ADMIN — LIDOCAINE HYDROCHLORIDE 4 ML: 20 JELLY TOPICAL at 04:12

## 2021-12-09 RX ADMIN — CASIRIVIMAB AND IMDEVIMAB 600 MG: 600; 600 INJECTION, SOLUTION, CONCENTRATE INTRAVENOUS at 08:12

## 2021-12-09 RX ADMIN — SODIUM CHLORIDE, SODIUM LACTATE, POTASSIUM CHLORIDE, AND CALCIUM CHLORIDE: .6; .31; .03; .02 INJECTION, SOLUTION INTRAVENOUS at 04:12

## 2021-12-09 RX ADMIN — ATORVASTATIN CALCIUM 40 MG: 20 TABLET, FILM COATED ORAL at 08:12

## 2021-12-09 RX ADMIN — ACETAMINOPHEN 1000 MG: 500 TABLET, FILM COATED ORAL at 02:12

## 2021-12-10 VITALS
OXYGEN SATURATION: 95 % | SYSTOLIC BLOOD PRESSURE: 153 MMHG | TEMPERATURE: 100 F | RESPIRATION RATE: 18 BRPM | BODY MASS INDEX: 37.05 KG/M2 | HEART RATE: 77 BPM | WEIGHT: 298 LBS | DIASTOLIC BLOOD PRESSURE: 80 MMHG | HEIGHT: 75 IN

## 2021-12-10 LAB
ALBUMIN SERPL BCP-MCNC: 2.3 G/DL (ref 3.5–5.2)
ALP SERPL-CCNC: 69 U/L (ref 55–135)
ALT SERPL W/O P-5'-P-CCNC: 5 U/L (ref 10–44)
ANION GAP SERPL CALC-SCNC: 10 MMOL/L (ref 8–16)
AST SERPL-CCNC: 8 U/L (ref 10–40)
BASOPHILS NFR BLD: 0 % (ref 0–1.9)
BILIRUB SERPL-MCNC: 0.6 MG/DL (ref 0.1–1)
BUN SERPL-MCNC: 15 MG/DL (ref 6–20)
CALCIUM SERPL-MCNC: 7.7 MG/DL (ref 8.7–10.5)
CHLORIDE SERPL-SCNC: 99 MMOL/L (ref 95–110)
CO2 SERPL-SCNC: 24 MMOL/L (ref 23–29)
CREAT SERPL-MCNC: 1.5 MG/DL (ref 0.5–1.4)
CRP SERPL-MCNC: 33.81 MG/DL
DIFFERENTIAL METHOD: ABNORMAL
EOSINOPHIL NFR BLD: 1 % (ref 0–8)
ERYTHROCYTE [DISTWIDTH] IN BLOOD BY AUTOMATED COUNT: 12.7 % (ref 11.5–14.5)
EST. GFR  (AFRICAN AMERICAN): 59.3 ML/MIN/1.73 M^2
EST. GFR  (NON AFRICAN AMERICAN): 51.3 ML/MIN/1.73 M^2
GLUCOSE SERPL-MCNC: 84 MG/DL (ref 70–110)
GLUCOSE SERPL-MCNC: 88 MG/DL (ref 70–110)
GLUCOSE SERPL-MCNC: 93 MG/DL (ref 70–110)
HCT VFR BLD AUTO: 35.5 % (ref 40–54)
HGB BLD-MCNC: 11.5 G/DL (ref 14–18)
IMM GRANULOCYTES # BLD AUTO: ABNORMAL K/UL (ref 0–0.04)
IMM GRANULOCYTES NFR BLD AUTO: ABNORMAL % (ref 0–0.5)
LYMPHOCYTES NFR BLD: 5 % (ref 18–48)
MCH RBC QN AUTO: 29.1 PG (ref 27–31)
MCHC RBC AUTO-ENTMCNC: 32.4 G/DL (ref 32–36)
MCV RBC AUTO: 90 FL (ref 82–98)
MONOCYTES NFR BLD: 3 % (ref 4–15)
NEUTROPHILS NFR BLD: 88 % (ref 38–73)
NEUTS BAND NFR BLD MANUAL: 3 %
NRBC BLD-RTO: 0 /100 WBC
PLATELET # BLD AUTO: 155 K/UL (ref 150–450)
PLATELET BLD QL SMEAR: ABNORMAL
PMV BLD AUTO: 9 FL (ref 9.2–12.9)
POTASSIUM SERPL-SCNC: 3.9 MMOL/L (ref 3.5–5.1)
PROT SERPL-MCNC: 6.2 G/DL (ref 6–8.4)
RBC # BLD AUTO: 3.95 M/UL (ref 4.6–6.2)
SODIUM SERPL-SCNC: 133 MMOL/L (ref 136–145)
WBC # BLD AUTO: 7.04 K/UL (ref 3.9–12.7)

## 2021-12-10 PROCEDURE — 36415 COLL VENOUS BLD VENIPUNCTURE: CPT | Performed by: STUDENT IN AN ORGANIZED HEALTH CARE EDUCATION/TRAINING PROGRAM

## 2021-12-10 PROCEDURE — 85027 COMPLETE CBC AUTOMATED: CPT | Performed by: STUDENT IN AN ORGANIZED HEALTH CARE EDUCATION/TRAINING PROGRAM

## 2021-12-10 PROCEDURE — 63600175 PHARM REV CODE 636 W HCPCS: Performed by: SURGERY

## 2021-12-10 PROCEDURE — 97530 THERAPEUTIC ACTIVITIES: CPT

## 2021-12-10 PROCEDURE — 96367 TX/PROPH/DG ADDL SEQ IV INF: CPT

## 2021-12-10 PROCEDURE — 85007 BL SMEAR W/DIFF WBC COUNT: CPT | Performed by: STUDENT IN AN ORGANIZED HEALTH CARE EDUCATION/TRAINING PROGRAM

## 2021-12-10 PROCEDURE — 96361 HYDRATE IV INFUSION ADD-ON: CPT

## 2021-12-10 PROCEDURE — 97165 OT EVAL LOW COMPLEX 30 MIN: CPT

## 2021-12-10 PROCEDURE — 25000003 PHARM REV CODE 250: Performed by: SURGERY

## 2021-12-10 PROCEDURE — 96375 TX/PRO/DX INJ NEW DRUG ADDON: CPT

## 2021-12-10 PROCEDURE — 97535 SELF CARE MNGMENT TRAINING: CPT

## 2021-12-10 PROCEDURE — 86140 C-REACTIVE PROTEIN: CPT | Performed by: STUDENT IN AN ORGANIZED HEALTH CARE EDUCATION/TRAINING PROGRAM

## 2021-12-10 PROCEDURE — 25000003 PHARM REV CODE 250: Performed by: STUDENT IN AN ORGANIZED HEALTH CARE EDUCATION/TRAINING PROGRAM

## 2021-12-10 PROCEDURE — G0378 HOSPITAL OBSERVATION PER HR: HCPCS

## 2021-12-10 PROCEDURE — 96366 THER/PROPH/DIAG IV INF ADDON: CPT

## 2021-12-10 PROCEDURE — 63600175 PHARM REV CODE 636 W HCPCS: Performed by: STUDENT IN AN ORGANIZED HEALTH CARE EDUCATION/TRAINING PROGRAM

## 2021-12-10 PROCEDURE — 80053 COMPREHEN METABOLIC PANEL: CPT | Performed by: STUDENT IN AN ORGANIZED HEALTH CARE EDUCATION/TRAINING PROGRAM

## 2021-12-10 RX ORDER — LINEZOLID 600 MG/1
600 TABLET, FILM COATED ORAL EVERY 12 HOURS
Qty: 20 TABLET | Refills: 0 | Status: SHIPPED | OUTPATIENT
Start: 2021-12-10 | End: 2021-12-20

## 2021-12-10 RX ADMIN — HYDROCODONE BITARTRATE AND ACETAMINOPHEN 1 TABLET: 5; 325 TABLET ORAL at 05:12

## 2021-12-10 RX ADMIN — VANCOMYCIN HYDROCHLORIDE 2000 MG: 500 INJECTION, POWDER, LYOPHILIZED, FOR SOLUTION INTRAVENOUS at 05:12

## 2021-12-10 RX ADMIN — AMLODIPINE BESYLATE 10 MG: 5 TABLET ORAL at 08:12

## 2021-12-10 RX ADMIN — HYDROMORPHONE HYDROCHLORIDE 1 MG: 1 INJECTION, SOLUTION INTRAMUSCULAR; INTRAVENOUS; SUBCUTANEOUS at 01:12

## 2021-12-10 RX ADMIN — VANCOMYCIN HYDROCHLORIDE 2000 MG: 500 INJECTION, POWDER, LYOPHILIZED, FOR SOLUTION INTRAVENOUS at 03:12

## 2021-12-10 RX ADMIN — ASPIRIN 81 MG 81 MG: 81 TABLET ORAL at 08:12

## 2021-12-10 RX ADMIN — MUPIROCIN 1 G: 20 OINTMENT TOPICAL at 08:12

## 2021-12-12 LAB — BACTERIA SPEC ANAEROBE CULT: NORMAL

## 2021-12-13 LAB
BACTERIA SPEC AEROBE CULT: ABNORMAL
BACTERIA SPEC AEROBE CULT: ABNORMAL

## 2021-12-14 LAB
BACTERIA BLD CULT: NORMAL
BACTERIA BLD CULT: NORMAL

## 2022-02-28 ENCOUNTER — HOSPITAL ENCOUNTER (EMERGENCY)
Facility: HOSPITAL | Age: 57
Discharge: HOME | End: 2022-02-28
Attending: EMERGENCY MEDICINE

## 2022-02-28 VITALS
WEIGHT: 315 LBS | TEMPERATURE: 99 F | SYSTOLIC BLOOD PRESSURE: 196 MMHG | HEIGHT: 75 IN | DIASTOLIC BLOOD PRESSURE: 84 MMHG | RESPIRATION RATE: 18 BRPM | HEART RATE: 66 BPM | OXYGEN SATURATION: 100 % | BODY MASS INDEX: 39.17 KG/M2

## 2022-02-28 DIAGNOSIS — M79.89 PAIN AND SWELLING OF LOWER EXTREMITY: ICD-10-CM

## 2022-02-28 DIAGNOSIS — M25.562 ACUTE PAIN OF LEFT KNEE: Primary | ICD-10-CM

## 2022-02-28 DIAGNOSIS — R52 PAIN: ICD-10-CM

## 2022-02-28 DIAGNOSIS — M19.90 OSTEOARTHRITIS, UNSPECIFIED OSTEOARTHRITIS TYPE, UNSPECIFIED SITE: ICD-10-CM

## 2022-02-28 DIAGNOSIS — M79.606 PAIN AND SWELLING OF LOWER EXTREMITY: ICD-10-CM

## 2022-02-28 PROCEDURE — 99284 EMERGENCY DEPT VISIT MOD MDM: CPT | Mod: 25

## 2022-02-28 RX ORDER — HYDROCODONE BITARTRATE AND ACETAMINOPHEN 5; 325 MG/1; MG/1
1 TABLET ORAL EVERY 4 HOURS PRN
Qty: 8 TABLET | Refills: 0 | Status: SHIPPED | OUTPATIENT
Start: 2022-02-28

## 2022-02-28 NOTE — ED PROVIDER NOTES
Encounter Date: 2/28/2022       History     Chief Complaint   Patient presents with    Knee Pain     SINCE THANKGIVING, 2021     56-year-old male presents emergency department reports that he is head nontraumatic left knee pain since November.  Patient denies any associated fevers he states that he came today because he can no longer stand the pain.  Patient has not tried anything to make the pain better or worse        Review of patient's allergies indicates:  No Known Allergies  Past Medical History:   Diagnosis Date    COVID-19     Hypertension     Obese      Past Surgical History:   Procedure Laterality Date    INCISION AND DRAINAGE Right 12/9/2021    Procedure: INCISION AND DRAINAGE, HEMATOMA, SEROMA, OR FLUID COLLECTION;  Surgeon: Chandler Street MD;  Location: [facility];  Service: General;  Laterality: Right;     No family history on file.     Review of Systems   Constitutional: Negative.    HENT: Negative.    Respiratory: Negative.    Cardiovascular: Negative.    Gastrointestinal: Negative.    Genitourinary: Negative.    Musculoskeletal:        Left knee pain   Skin: Negative.    Neurological: Negative.    Hematological: Negative.    Psychiatric/Behavioral: Negative.    All other systems reviewed and are negative.      Physical Exam     Initial Vitals [02/28/22 1448]   BP Pulse Resp Temp SpO2   (!) 184/101 84 18 98.1 °F (36.7 °C) 98 %      MAP       --         Physical Exam    Nursing note reviewed.  Constitutional: He appears well-developed and well-nourished.   HENT:   Head: Normocephalic and atraumatic.   Cardiovascular: Normal rate, regular rhythm and normal heart sounds.   Pulmonary/Chest: Breath sounds normal.   Musculoskeletal:      Left knee: Swelling and effusion present. No crepitus. Tenderness present over the patellar tendon.     Neurological: He is alert and oriented to person, place, and time. He has normal strength.   Skin: Skin is warm and dry.   PVD appearance  noted to both bilateral  lower extremities distal pulses are intact         ED Course   Procedures  Labs Reviewed - No data to display       Imaging Results          US Lower Extremity Veins Left (Final result)  Result time 02/28/22 17:33:27    Final result by Petr Adams MD (02/28/22 17:33:27)                 Narrative:    US LOWER EXTREMITY VEINS LIMITED FOLLOW-UP UNILATERAL    CLINICAL HISTORY:  56 years Male left knee and leg pain . fall in Nov 2021    FINDINGS: Grayscale, color and spectral Doppler analysis of the left lower extremity deep venous system was performed.    There is normal compressibility, with normal flow by color and spectral Doppler analysis in the left lower extremity deep venous system, with normal augmentation and no evidence of deep venous thrombosis.    Incidentally noted left knee joint effusion with synovitis/debris.    IMPRESSION:    Negative for left lower DVT.    Left knee joint effusion.    Electronically signed by:  Petr Adams MD  2/28/2022 5:33 PM CST Workstation: [id number]                             X-Ray Knee Complete 4 or more Views Left (Final result)  Result time 02/28/22 15:31:31   Procedure changed from X-Ray Knee 3 View Left     Final result by Wilian Gomez MD (02/28/22 15:31:31)                 Narrative:    CLINICAL HISTORY:  56 years (1965) Male pain Knee Pain    TECHNIQUE:  XR KNEE 4 OR MORE VIEWS. 4 image(s) obtained.    COMPARISON:  None available.    FINDINGS:  No acute fracture or dislocation. Moderate medial and patellofemoral compartment joint space narrowing with mild lateral compartment joint space narrowing and moderate-sized tricompartment osteophytes in keeping with tricompartment osteoarthrosis. There is a moderate-sized knee joint effusion in the suprapatellar recess. Soft tissues are radiographically within normal limits with no radiopaque foreign body seen.    IMPRESSION:  1. No acute fracture or dislocation.  2. Small knee joint effusion.  3.  Tricompartment osteoarthrosis.                  .    Electronically signed by:  Wilian Gomez MD  2/28/2022 3:31 PM [facility] Workstation: [id number]                               Medications - No data to display  Medical Decision Making:   Initial Assessment:   56-year-old male presents emergency department reports that he is head nontraumatic left knee pain since November.  Patient denies any associated fevers he states that he came today because he can no longer stand the pain.  Patient has not tried anything to make the pain better or worse        Differential Diagnosis:   Fracture, ligamentous strain, effusion, gout    ED Management:  56-year-old well-appearing male presents emergency department complaint of nontraumatic left knee pain.  Patient's pain is to the anterior aspect of his knee x-ray imaging reveals a small knee effusion, and osteoarthritis is no fracture or dislocation ultrasound reveals no evidence of DVT there is a small left joint effusion he incidentally noted a joint effusion with synovitis/debris on ultrasound imaging.  Patient has had no fevers any is not diabetic there is no erythema or warmth of the joint therefore no antibiotics will be started at this time.  Patient will be instructed follow up with orthopedist for definitive care.                      Clinical Impression:   Final diagnoses:  [R52] Pain  [M79.606, M79.89] Pain and swelling of lower extremity  [M25.562] Acute pain of left knee (Primary)  [M19.90] Osteoarthritis, unspecified osteoarthritis type, unspecified site          ED Disposition Condition    Discharge Stable        ED Prescriptions     Medication Sig Dispense Start Date End Date Auth. Provider    HYDROcodone-acetaminophen (NORCO) 5-325 mg per tablet Take 1 tablet by mouth every 4 (four) hours as needed for Pain. 8 tablet 2/28/2022  LIT Mckoy        Follow-up Information     Follow up With Specialties Details Why Contact Info    Pardeep Ulloa MD  Orthopedic Surgery Schedule an appointment as soon as possible for a visit in 2 days  [address]  [address]  [facility]  [facility] [address]  [phone]             LIT Mckoy  02/28/22 9731

## 2022-02-28 NOTE — ED NOTES
Fell down 40 steps Nov 2021. States started new job and knee pain is getting worse. C/o pain with wt bearing and flexion/extension. Left knee swollen, tender to touch.

## 2022-03-01 NOTE — DISCHARGE INSTRUCTIONS
Norco for pain   Please follow-up with orthopedics as directed  Return for any concerns or if he develops a fever

## 2022-03-29 ENCOUNTER — HOSPITAL ENCOUNTER (INPATIENT)
Facility: HOSPITAL | Age: 57
LOS: 3 days | Discharge: HOME | DRG: 603 | End: 2022-04-01
Attending: EMERGENCY MEDICINE | Admitting: INTERNAL MEDICINE

## 2022-03-29 ENCOUNTER — PATIENT OUTREACH (OUTPATIENT)
Dept: EMERGENCY MEDICINE | Facility: HOSPITAL | Age: 57
End: 2022-03-29

## 2022-03-29 DIAGNOSIS — R52 PAIN: ICD-10-CM

## 2022-03-29 DIAGNOSIS — L03.90 CELLULITIS, UNSPECIFIED CELLULITIS SITE: ICD-10-CM

## 2022-03-29 DIAGNOSIS — B37.2 CANDIDAL INTERTRIGO: ICD-10-CM

## 2022-03-29 DIAGNOSIS — L02.419 ABSCESS OF LEG: ICD-10-CM

## 2022-03-29 DIAGNOSIS — L73.9 FOLLICULITIS: ICD-10-CM

## 2022-03-29 DIAGNOSIS — L03.115 CELLULITIS OF RIGHT LOWER EXTREMITY: Primary | ICD-10-CM

## 2022-03-29 DIAGNOSIS — R53.83 FATIGUE: ICD-10-CM

## 2022-03-29 DIAGNOSIS — E66.9 CLASS 2 OBESITY WITHOUT SERIOUS COMORBIDITY WITH BODY MASS INDEX (BMI) OF 38.0 TO 38.9 IN ADULT, UNSPECIFIED OBESITY TYPE: ICD-10-CM

## 2022-03-29 PROBLEM — I10 HTN (HYPERTENSION): Status: ACTIVE | Noted: 2022-03-29

## 2022-03-29 PROBLEM — R79.89 LACTIC ACID BLOOD INCREASED: Status: ACTIVE | Noted: 2022-03-29

## 2022-03-29 PROBLEM — E66.01 SEVERE OBESITY (BMI >= 40): Status: ACTIVE | Noted: 2022-03-29

## 2022-03-29 PROBLEM — D69.6 THROMBOCYTOPENIA: Status: ACTIVE | Noted: 2022-03-29

## 2022-03-29 LAB
ALBUMIN SERPL BCP-MCNC: 3.2 G/DL (ref 3.5–5.2)
ALP SERPL-CCNC: 73 U/L (ref 55–135)
ALT SERPL W/O P-5'-P-CCNC: 10 U/L (ref 10–44)
ANION GAP SERPL CALC-SCNC: 8 MMOL/L (ref 8–16)
AST SERPL-CCNC: 17 U/L (ref 10–40)
BASOPHILS # BLD AUTO: 0.01 K/UL (ref 0–0.2)
BASOPHILS NFR BLD: 0.1 % (ref 0–1.9)
BILIRUB SERPL-MCNC: 0.9 MG/DL (ref 0.1–1)
BUN SERPL-MCNC: 15 MG/DL (ref 6–20)
CALCIUM SERPL-MCNC: 8.1 MG/DL (ref 8.7–10.5)
CHLORIDE SERPL-SCNC: 96 MMOL/L (ref 95–110)
CO2 SERPL-SCNC: 28 MMOL/L (ref 23–29)
CREAT SERPL-MCNC: 1.3 MG/DL (ref 0.5–1.4)
CRP SERPL-MCNC: 25.21 MG/DL
DIFFERENTIAL METHOD: ABNORMAL
EOSINOPHIL # BLD AUTO: 0 K/UL (ref 0–0.5)
EOSINOPHIL NFR BLD: 0.1 % (ref 0–8)
ERYTHROCYTE [DISTWIDTH] IN BLOOD BY AUTOMATED COUNT: 12.4 % (ref 11.5–14.5)
ERYTHROCYTE [SEDIMENTATION RATE] IN BLOOD BY WESTERGREN METHOD: 53 MM/HR (ref 0–10)
EST. GFR  (AFRICAN AMERICAN): >60 ML/MIN/1.73 M^2
EST. GFR  (NON AFRICAN AMERICAN): >60 ML/MIN/1.73 M^2
GLUCOSE SERPL-MCNC: 106 MG/DL (ref 70–110)
HCT VFR BLD AUTO: 41.6 % (ref 40–54)
HGB BLD-MCNC: 13.5 G/DL (ref 14–18)
IMM GRANULOCYTES # BLD AUTO: 0.09 K/UL (ref 0–0.04)
IMM GRANULOCYTES NFR BLD AUTO: 0.9 % (ref 0–0.5)
INR PPP: 1.2
LACTATE SERPL-SCNC: 1.4 MMOL/L (ref 0.5–1.9)
LACTATE SERPL-SCNC: 2 MMOL/L (ref 0.5–1.9)
LYMPHOCYTES # BLD AUTO: 0.4 K/UL (ref 1–4.8)
LYMPHOCYTES NFR BLD: 4 % (ref 18–48)
MCH RBC QN AUTO: 27.8 PG (ref 27–31)
MCHC RBC AUTO-ENTMCNC: 32.5 G/DL (ref 32–36)
MCV RBC AUTO: 86 FL (ref 82–98)
MONOCYTES # BLD AUTO: 0.3 K/UL (ref 0.3–1)
MONOCYTES NFR BLD: 3.1 % (ref 4–15)
NEUTROPHILS # BLD AUTO: 9.6 K/UL (ref 1.8–7.7)
NEUTROPHILS NFR BLD: 91.8 % (ref 38–73)
NRBC BLD-RTO: 0 /100 WBC
PLATELET # BLD AUTO: 140 K/UL (ref 150–450)
PMV BLD AUTO: 9.2 FL (ref 9.2–12.9)
POTASSIUM SERPL-SCNC: 3.8 MMOL/L (ref 3.5–5.1)
PROCALCITONIN SERPL IA-MCNC: 1.13 NG/ML (ref 0–0.5)
PROT SERPL-MCNC: 7 G/DL (ref 6–8.4)
PROTHROMBIN TIME: 14.3 SEC (ref 11.4–13.7)
RBC # BLD AUTO: 4.85 M/UL (ref 4.6–6.2)
SARS-COV-2 RDRP RESP QL NAA+PROBE: NEGATIVE
SODIUM SERPL-SCNC: 132 MMOL/L (ref 136–145)
WBC # BLD AUTO: 10.47 K/UL (ref 3.9–12.7)

## 2022-03-29 PROCEDURE — 25000003 PHARM REV CODE 250: Performed by: EMERGENCY MEDICINE

## 2022-03-29 PROCEDURE — 93010 ELECTROCARDIOGRAM REPORT: CPT | Mod: ,,, | Performed by: GENERAL PRACTICE

## 2022-03-29 PROCEDURE — 85651 RBC SED RATE NONAUTOMATED: CPT | Performed by: NURSE PRACTITIONER

## 2022-03-29 PROCEDURE — 85025 COMPLETE CBC W/AUTO DIFF WBC: CPT | Performed by: PHYSICIAN ASSISTANT

## 2022-03-29 PROCEDURE — 84145 PROCALCITONIN (PCT): CPT | Performed by: NURSE PRACTITIONER

## 2022-03-29 PROCEDURE — 83605 ASSAY OF LACTIC ACID: CPT | Mod: 91 | Performed by: PHYSICIAN ASSISTANT

## 2022-03-29 PROCEDURE — 87040 BLOOD CULTURE FOR BACTERIA: CPT | Mod: 59 | Performed by: PHYSICIAN ASSISTANT

## 2022-03-29 PROCEDURE — 93010 EKG 12-LEAD: ICD-10-PCS | Mod: ,,, | Performed by: GENERAL PRACTICE

## 2022-03-29 PROCEDURE — 25500020 PHARM REV CODE 255: Performed by: EMERGENCY MEDICINE

## 2022-03-29 PROCEDURE — 96365 THER/PROPH/DIAG IV INF INIT: CPT

## 2022-03-29 PROCEDURE — 99223 1ST HOSP IP/OBS HIGH 75: CPT | Mod: ,,, | Performed by: SURGERY

## 2022-03-29 PROCEDURE — 93005 ELECTROCARDIOGRAM TRACING: CPT | Performed by: GENERAL PRACTICE

## 2022-03-29 PROCEDURE — 36415 COLL VENOUS BLD VENIPUNCTURE: CPT | Performed by: NURSE PRACTITIONER

## 2022-03-29 PROCEDURE — 99285 EMERGENCY DEPT VISIT HI MDM: CPT | Mod: 25

## 2022-03-29 PROCEDURE — 86140 C-REACTIVE PROTEIN: CPT | Performed by: NURSE PRACTITIONER

## 2022-03-29 PROCEDURE — 80053 COMPREHEN METABOLIC PANEL: CPT | Performed by: PHYSICIAN ASSISTANT

## 2022-03-29 PROCEDURE — 99223 PR INITIAL HOSPITAL CARE,LEVL III: ICD-10-PCS | Mod: ,,, | Performed by: SURGERY

## 2022-03-29 PROCEDURE — U0002 COVID-19 LAB TEST NON-CDC: HCPCS | Performed by: EMERGENCY MEDICINE

## 2022-03-29 PROCEDURE — 85610 PROTHROMBIN TIME: CPT | Performed by: EMERGENCY MEDICINE

## 2022-03-29 PROCEDURE — 25000003 PHARM REV CODE 250: Performed by: NURSE PRACTITIONER

## 2022-03-29 PROCEDURE — 12000002 HC ACUTE/MED SURGE SEMI-PRIVATE ROOM

## 2022-03-29 RX ORDER — ONDANSETRON 4 MG/1
8 TABLET, ORALLY DISINTEGRATING ORAL EVERY 8 HOURS PRN
Status: DISCONTINUED | OUTPATIENT
Start: 2022-03-29 | End: 2022-04-01 | Stop reason: HOSPADM

## 2022-03-29 RX ORDER — TALC
6 POWDER (GRAM) TOPICAL NIGHTLY PRN
Status: DISCONTINUED | OUTPATIENT
Start: 2022-03-29 | End: 2022-04-01 | Stop reason: HOSPADM

## 2022-03-29 RX ORDER — AMLODIPINE BESYLATE 5 MG/1
10 TABLET ORAL DAILY
Status: DISCONTINUED | OUTPATIENT
Start: 2022-03-30 | End: 2022-04-01 | Stop reason: HOSPADM

## 2022-03-29 RX ORDER — CLINDAMYCIN PHOSPHATE 600 MG/50ML
600 INJECTION, SOLUTION INTRAVENOUS
Status: DISCONTINUED | OUTPATIENT
Start: 2022-03-29 | End: 2022-04-01 | Stop reason: HOSPADM

## 2022-03-29 RX ORDER — ATORVASTATIN CALCIUM 40 MG/1
40 TABLET, FILM COATED ORAL NIGHTLY
Status: DISCONTINUED | OUTPATIENT
Start: 2022-03-29 | End: 2022-04-01 | Stop reason: HOSPADM

## 2022-03-29 RX ORDER — HYDRALAZINE HYDROCHLORIDE 25 MG/1
25 TABLET, FILM COATED ORAL EVERY 8 HOURS PRN
Status: DISCONTINUED | OUTPATIENT
Start: 2022-03-29 | End: 2022-04-01 | Stop reason: HOSPADM

## 2022-03-29 RX ORDER — SODIUM CHLORIDE 0.9 % (FLUSH) 0.9 %
10 SYRINGE (ML) INJECTION EVERY 12 HOURS PRN
Status: DISCONTINUED | OUTPATIENT
Start: 2022-03-29 | End: 2022-04-01 | Stop reason: HOSPADM

## 2022-03-29 RX ORDER — ACETAMINOPHEN 325 MG/1
650 TABLET ORAL EVERY 8 HOURS PRN
Status: DISCONTINUED | OUTPATIENT
Start: 2022-03-29 | End: 2022-04-01 | Stop reason: HOSPADM

## 2022-03-29 RX ORDER — CLINDAMYCIN PHOSPHATE 900 MG/50ML
900 INJECTION, SOLUTION INTRAVENOUS
Status: COMPLETED | OUTPATIENT
Start: 2022-03-29 | End: 2022-03-29

## 2022-03-29 RX ORDER — ENOXAPARIN SODIUM 100 MG/ML
40 INJECTION SUBCUTANEOUS
Status: DISCONTINUED | OUTPATIENT
Start: 2022-03-29 | End: 2022-04-01 | Stop reason: HOSPADM

## 2022-03-29 RX ORDER — NAPROXEN SODIUM 220 MG/1
81 TABLET, FILM COATED ORAL DAILY
Status: DISCONTINUED | OUTPATIENT
Start: 2022-03-30 | End: 2022-04-01 | Stop reason: HOSPADM

## 2022-03-29 RX ORDER — HYDROCODONE BITARTRATE AND ACETAMINOPHEN 5; 325 MG/1; MG/1
1 TABLET ORAL EVERY 6 HOURS PRN
Status: DISCONTINUED | OUTPATIENT
Start: 2022-03-29 | End: 2022-04-01 | Stop reason: HOSPADM

## 2022-03-29 RX ORDER — NYSTATIN 100000 U/G
CREAM TOPICAL 2 TIMES DAILY
Status: DISCONTINUED | OUTPATIENT
Start: 2022-03-29 | End: 2022-04-01 | Stop reason: HOSPADM

## 2022-03-29 RX ORDER — SODIUM CHLORIDE 9 MG/ML
INJECTION, SOLUTION INTRAVENOUS CONTINUOUS
Status: DISCONTINUED | OUTPATIENT
Start: 2022-03-30 | End: 2022-03-30

## 2022-03-29 RX ADMIN — CLINDAMYCIN IN 5 PERCENT DEXTROSE 900 MG: 18 INJECTION, SOLUTION INTRAVENOUS at 03:03

## 2022-03-29 RX ADMIN — IOHEXOL 100 ML: 350 INJECTION, SOLUTION INTRAVENOUS at 02:03

## 2022-03-29 RX ADMIN — NYSTATIN: 100000 CREAM TOPICAL at 08:03

## 2022-03-29 RX ADMIN — CLINDAMYCIN IN 5 PERCENT DEXTROSE 600 MG: 12 INJECTION, SOLUTION INTRAVENOUS at 10:03

## 2022-03-29 RX ADMIN — ATORVASTATIN CALCIUM 40 MG: 40 TABLET, FILM COATED ORAL at 08:03

## 2022-03-29 RX ADMIN — HYDROCODONE BITARTRATE AND ACETAMINOPHEN 1 TABLET: 5; 325 TABLET ORAL at 06:03

## 2022-03-29 RX ADMIN — HYDRALAZINE HYDROCHLORIDE 25 MG: 25 TABLET ORAL at 06:03

## 2022-03-29 NOTE — ASSESSMENT & PLAN NOTE
NO identifiable drainable fluid collection present at this time.  Recommend abx and management of the yeast infection    Will assess in the AM to ensure no detrimental changes.

## 2022-03-29 NOTE — HPI
57 yo M whom I have seen in the past.  Pt had an I&D of  infected hematoma in the upper medial R thigh by me in 12/21.  He notes that this area has healed and been doing fine.  He presented to the ER today secondary to progressive and continued irritation in his groins bilaterally.  He notes that the area beneath his skin fold of the pannus has become increasingly pain and irritating over the last 7-10 days.  He denies any fevers or chills.  Given the increasing redness and irritation to this area presented to the ER.  He notes he has a history of fungal infections in this area that they typically resolve within a day or 2 of getting topical treatment.  This time it did not.  He also developed some irritation and swelling of the right leg with slight cellulitis below the knee of the right leg.  The area of the hematoma in his right upper leg he notes his healed has been non problematic.  In the ultrasound was noted that he may have a small abscess or fluid collection measuring 4 x 1 cm in the right groin.  Surgery was consulted for evaluation.

## 2022-03-29 NOTE — CONSULTS
[facility]  General Surgery  Consult Note    Patient Name: Nithin Townsend  MRN: [id number]  Code Status: Full Code  Admission Date: 3/29/2022  Hospital Length of Stay: 0 days  Attending Physician: Gómez Narvaez MD  Primary Care Provider: CAITLYN Avila    Patient information was obtained from patient and ER records.     Inpatient consult to General surgery  Consult performed by: Chandler Street MD  Consult ordered by: Vikas Carter MD        Subjective:     Principal Problem: Cellulitis    History of Present Illness: 57 yo M whom I have seen in the past.  Pt had an I&D of  infected hematoma in the upper medial R thigh by me in 12/21.  He notes that this area has healed and been doing fine.  He presented to the ER today secondary to progressive and continued irritation in his groins bilaterally.  He notes that the area beneath his skin fold of the pannus has become increasingly pain and irritating over the last 7-10 days.  He denies any fevers or chills.  Given the increasing redness and irritation to this area presented to the ER.  He notes he has a history of fungal infections in this area that they typically resolve within a day or 2 of getting topical treatment.  This time it did not.  He also developed some irritation and swelling of the right leg with slight cellulitis below the knee of the right leg.  The area of the hematoma in his right upper leg he notes his healed has been non problematic.  In the ultrasound was noted that he may have a small abscess or fluid collection measuring 4 x 1 cm in the right groin.  Surgery was consulted for evaluation.          No current facility-administered medications on file prior to encounter.     Current Outpatient Medications on File Prior to Encounter   Medication Sig    amLODIPine (NORVASC) 10 MG tablet Take 1 tablet (10 mg total) by mouth once daily.    aspirin 81 MG Chew Take 1 tablet (81 mg total) by mouth once daily.    atorvastatin  (LIPITOR) 40 MG tablet Take 1 tablet (40 mg total) by mouth every evening.    HYDROcodone-acetaminophen (NORCO) 5-325 mg per tablet Take 1 tablet by mouth every 4 (four) hours as needed for Pain.       Review of patient's allergies indicates:  No Known Allergies    Past Medical History:   Diagnosis Date    COVID-19     Hypertension     Obese      Past Surgical History:   Procedure Laterality Date    gatric bypass      HERNIA REPAIR      INCISION AND DRAINAGE Right 12/09/2021    Procedure: INCISION AND DRAINAGE, HEMATOMA, SEROMA, OR FLUID COLLECTION;  Surgeon: Chandler Street MD;  Location: [facility];  Service: General;  Laterality: Right;     Family History       Problem Relation (Age of Onset)    Diabetes Mother    Heart disease Father    Hyperlipidemia Father    Hypertension Mother, Father          Tobacco Use    Smoking status: Never Smoker    Smokeless tobacco: Never Used   Substance and Sexual Activity    Alcohol use: Not Currently    Drug use: Never    Sexual activity: Not Currently     Review of Systems   Respiratory:  Negative for apnea and chest tightness.    Skin:  Positive for color change.   Objective:     Vital Signs (Most Recent):  Temp: 100 °F (37.8 °C) (03/29/22 1741)  Pulse: 82 (03/29/22 1837)  Resp: 18 (03/29/22 1807)  BP: 136/72 (03/29/22 1837)  SpO2: 96 % (03/29/22 1741) Vital Signs (24h Range):  Temp:  [99.1 °F (37.3 °C)-100 °F (37.8 °C)] 100 °F (37.8 °C)  Pulse:  [75-82] 82  Resp:  [18-25] 18  SpO2:  [96 %-100 %] 96 %  BP: (136-198)/(72-86) 136/72     Weight: (!) 139.5 kg (307 lb 8 oz)  Body mass index is 38.43 kg/m².    Physical Exam  Skin:     Comments: Significant erythema and excoriation with moisture and foul smell bilaterally in the groins.  Thorough investigation with no palpable/fluctuant fluid collection.    Area in the thigh where hematoma was appeared to have healed without signs of infection.      Edema and splotchy cellulitis noted below the R knee       Significant  Labs:  I have reviewed all pertinent lab results within the past 24 hours.  CBC:   Recent Labs   Lab 03/29/22  1210   WBC 10.47   RBC 4.85   HGB 13.5*   HCT 41.6   *   MCV 86   MCH 27.8   MCHC 32.5     BMP:   Recent Labs   Lab 03/29/22  1210      *   K 3.8   CL 96   CO2 28   BUN 15   CREATININE 1.3   CALCIUM 8.1*       Significant Diagnostics:  US and CT scan reviewed.  NO obvious fluid collections noted.         Assessment/Plan:     * Cellulitis  NO identifiable drainable fluid collection present at this time.  Recommend abx and management of the yeast infection    Will assess in the AM to ensure no detrimental changes.        VTE Risk Mitigation (From admission, onward)         Ordered     IP VTE LOW RISK PATIENT  Once         03/29/22 1630     Place sequential compression device  Until discontinued         03/29/22 1630                Thank you for your consult. I will follow-up with patient. Please contact us if you have any additional questions.    Chandler Street MD  General Surgery  [facility]

## 2022-03-29 NOTE — SUBJECTIVE & OBJECTIVE
No current facility-administered medications on file prior to encounter.     Current Outpatient Medications on File Prior to Encounter   Medication Sig    amLODIPine (NORVASC) 10 MG tablet Take 1 tablet (10 mg total) by mouth once daily.    aspirin 81 MG Chew Take 1 tablet (81 mg total) by mouth once daily.    atorvastatin (LIPITOR) 40 MG tablet Take 1 tablet (40 mg total) by mouth every evening.    HYDROcodone-acetaminophen (NORCO) 5-325 mg per tablet Take 1 tablet by mouth every 4 (four) hours as needed for Pain.       Review of patient's allergies indicates:  No Known Allergies    Past Medical History:   Diagnosis Date    COVID-19     Hypertension     Obese      Past Surgical History:   Procedure Laterality Date    gatric bypass      HERNIA REPAIR      INCISION AND DRAINAGE Right 12/09/2021    Procedure: INCISION AND DRAINAGE, HEMATOMA, SEROMA, OR FLUID COLLECTION;  Surgeon: Chandler Street MD;  Location: [facility];  Service: General;  Laterality: Right;     Family History       Problem Relation (Age of Onset)    Diabetes Mother    Heart disease Father    Hyperlipidemia Father    Hypertension Mother, Father          Tobacco Use    Smoking status: Never Smoker    Smokeless tobacco: Never Used   Substance and Sexual Activity    Alcohol use: Not Currently    Drug use: Never    Sexual activity: Not Currently     Review of Systems   Respiratory:  Negative for apnea and chest tightness.    Skin:  Positive for color change.   Objective:     Vital Signs (Most Recent):  Temp: 100 °F (37.8 °C) (03/29/22 1741)  Pulse: 82 (03/29/22 1837)  Resp: 18 (03/29/22 1807)  BP: 136/72 (03/29/22 1837)  SpO2: 96 % (03/29/22 1741) Vital Signs (24h Range):  Temp:  [99.1 °F (37.3 °C)-100 °F (37.8 °C)] 100 °F (37.8 °C)  Pulse:  [75-82] 82  Resp:  [18-25] 18  SpO2:  [96 %-100 %] 96 %  BP: (136-198)/(72-86) 136/72     Weight: (!) 139.5 kg (307 lb 8 oz)  Body mass index is 38.43 kg/m².    Physical Exam  Skin:     Comments: Significant erythema  and excoriation with moisture and foul smell bilaterally in the groins.  Thorough investigation with no palpable/fluctuant fluid collection.    Area in the thigh where hematoma was appeared to have healed without signs of infection.      Edema and splotchy cellulitis noted below the R knee       Significant Labs:  I have reviewed all pertinent lab results within the past 24 hours.  CBC:   Recent Labs   Lab 03/29/22  1210   WBC 10.47   RBC 4.85   HGB 13.5*   HCT 41.6   *   MCV 86   MCH 27.8   MCHC 32.5     BMP:   Recent Labs   Lab 03/29/22  1210      *   K 3.8   CL 96   CO2 28   BUN 15   CREATININE 1.3   CALCIUM 8.1*       Significant Diagnostics:  US and CT scan reviewed.  NO obvious fluid collections noted.

## 2022-03-29 NOTE — FIRST PROVIDER EVALUATION
Emergency Department TeleTriage Encounter Note      CHIEF COMPLAINT    Chief Complaint   Patient presents with    Wound Check     Patient reports redness and oozing to abdomen, also reports leg swelling        VITAL SIGNS   Initial Vitals [03/29/22 1140]   BP Pulse Resp Temp SpO2   (!) 186/81 75 18 99.1 °F (37.3 °C) 100 %      MAP       --            ALLERGIES    Review of patient's allergies indicates:  No Known Allergies    PROVIDER TRIAGE NOTE    Patient presents to the ER for evaluation due to redness to abdomen and wheeping fluid since yesterfday.  He has red spot on right thigh since yesterday - history of I&D of abscess in this area 12/2021.    He has low energy/fatigue since yesterday.  He denies fever.    Will order sepsis workup due to multiple wounds - unable to see wounds -nurse unable to position ipad to see abdomen/leg.  Patient in no acute distress, /81, temp 99.1, pulse 75, oxygen 100%.     Initial orders will be placed and care will be transferred to an alternate provider when patient is roomed for a full evaluation. Any additional orders and the final disposition will be determined by that provider.         ORDERS  Labs Reviewed - No data to display    ED Orders (720h ago, onward)    None            Virtual Visit Note: The provider triage portion of this emergency department evaluation and documentation was performed via [employer], a HIPAA-compliant telemedicine application, in concert with a tele-presenter in the room. A face to face patient evaluation with one of my colleagues will occur once the patient is placed in an emergency department room.      DISCLAIMER: This note was prepared with [employer] voice recognition transcription software. Garbled syntax, mangled pronouns, and other bizarre constructions may be attributed to that software system.

## 2022-03-29 NOTE — ED PROVIDER NOTES
Encounter Date: 3/29/2022       History     Chief Complaint   Patient presents with    Wound Check     Patient reports redness and oozing to abdomen, also reports leg swelling      57 yo M presents with redness and irritation to groin area.  Patient reports he noticed this yesterday.  Patient also complaints of redness and irritation to the right leg along with swelling and discomfort.          Review of patient's allergies indicates:  No Known Allergies  Past Medical History:   Diagnosis Date    COVID-19     Hypertension     Obese      Past Surgical History:   Procedure Laterality Date    gatric bypass      HERNIA REPAIR      INCISION AND DRAINAGE Right 12/09/2021    Procedure: INCISION AND DRAINAGE, HEMATOMA, SEROMA, OR FLUID COLLECTION;  Surgeon: Chandler Street MD;  Location: [facility] OR;  Service: General;  Laterality: Right;     Family History   Problem Relation Age of Onset    Hypertension Mother     Diabetes Mother     Hyperlipidemia Father     Heart disease Father     Hypertension Father      Social History     Tobacco Use    Smoking status: Never Smoker    Smokeless tobacco: Never Used   Substance Use Topics    Alcohol use: Not Currently    Drug use: Never     Review of Systems   Constitutional: Negative for fever.   HENT: Negative for congestion, rhinorrhea, sore throat and trouble swallowing.    Eyes: Negative for visual disturbance.   Respiratory: Negative for cough, chest tightness, shortness of breath and wheezing.    Cardiovascular: Negative for chest pain, palpitations and leg swelling.   Gastrointestinal: Negative for abdominal distention, abdominal pain, constipation, diarrhea, nausea and vomiting.   Genitourinary: Negative for difficulty urinating, dysuria, flank pain and frequency.   Musculoskeletal: Negative for arthralgias, back pain, joint swelling and neck pain.        Leg swelling and pain   Skin: Positive for wound. Negative for color change and rash.   Neurological: Negative  for dizziness, syncope, speech difficulty, weakness, numbness and headaches.   All other systems reviewed and are negative.      Physical Exam     Initial Vitals [03/29/22 1140]   BP Pulse Resp Temp SpO2   (!) 186/81 75 18 99.1 °F (37.3 °C) 100 %      MAP       --         Physical Exam    Nursing note and vitals reviewed.  Constitutional: He appears well-developed and well-nourished. He is not diaphoretic. No distress.   HENT:   Head: Normocephalic and atraumatic.   Right Ear: External ear normal.   Left Ear: External ear normal.   Nose: Nose normal.   Mouth/Throat: Oropharynx is clear and moist. No oropharyngeal exudate.   Eyes: Conjunctivae and EOM are normal. Pupils are equal, round, and reactive to light. Right eye exhibits no discharge. Left eye exhibits no discharge. No scleral icterus.   Neck: Neck supple. No thyromegaly present. No tracheal deviation present. No JVD present.   Normal range of motion.  Cardiovascular: Normal rate, regular rhythm, normal heart sounds and intact distal pulses. Exam reveals no gallop and no friction rub.    No murmur heard.  Pulmonary/Chest: Breath sounds normal. No stridor. No respiratory distress. He has no wheezes. He has no rhonchi. He has no rales. He exhibits no tenderness.   Abdominal: Abdomen is soft. Bowel sounds are normal. He exhibits no distension and no mass. There is no abdominal tenderness. There is no rebound and no guarding.   Genitourinary:    Genitourinary Comments: Patient has erythema and foul odor to sub-pannus area and folds of groin consistent with yeast infection.       Musculoskeletal:         General: Tenderness and edema present. Normal range of motion.      Cervical back: Normal range of motion and neck supple.      Comments: Tenderness and swelling to right leg with redness to the right leg, splotchy skin discoloration noted 1+ DP's/  FROM.       Lymphadenopathy:     He has no cervical adenopathy.   Neurological: He is alert and oriented to person,  place, and time. He has normal strength. No cranial nerve deficit or sensory deficit.   Skin: Skin is warm and dry. No rash noted. No erythema.         ED Course   Procedures  Labs Reviewed   CBC W/ AUTO DIFFERENTIAL - Abnormal; Notable for the following components:       Result Value    Hemoglobin 13.5 (*)     Platelets 140 (*)     Immature Granulocytes 0.9 (*)     Gran # (ANC) 9.6 (*)     Immature Grans (Abs) 0.09 (*)     Lymph # 0.4 (*)     Gran % 91.8 (*)     Lymph % 4.0 (*)     Mono % 3.1 (*)     All other components within normal limits   COMPREHENSIVE METABOLIC PANEL - Abnormal; Notable for the following components:    Sodium 132 (*)     Calcium 8.1 (*)     Albumin 3.2 (*)     All other components within normal limits   LACTIC ACID, PLASMA - Abnormal; Notable for the following components:    Lactate (Lactic Acid) 2.0 (*)     All other components within normal limits    Narrative:     LACTIC ACID critical result(s) repeated. Called and verbal readback   obtained from [facility] by FF1 03/29/2022 13:01   PROTIME-INR - Abnormal; Notable for the following components:    PT 14.3 (*)     All other components within normal limits   SARS-COV-2 RNA AMPLIFICATION, QUAL   URINALYSIS, REFLEX TO URINE CULTURE   POCT GLUCOSE MONITORING CONTINUOUS        ECG Results          EKG 12-lead (In process)  Result time 03/29/22 13:34:38    In process by Interface, Lab In [facility] (03/29/22 13:34:38)                 Narrative:    Test Reason : R53.83,    Vent. Rate : 067 BPM     Atrial Rate : 067 BPM     P-R Int : 134 ms          QRS Dur : 086 ms      QT Int : 440 ms       P-R-T Axes : 043 044 047 degrees     QTc Int : 464 ms    Normal sinus rhythm  Normal ECG  When compared with ECG of 22-JUN-2021 16:09,  No significant change was found    Referred By: [email or web address]   SELF           Confirmed By:                             Imaging Results          CT Abdomen Pelvis With Contrast (Final result)  Result time 03/29/22 14:41:19     Final result by Maryanne Morejon MD (03/29/22 14:41:19)                 Narrative:    All CT scans at this facility used dose modulation, iterative reconstruction and/or weight-based dosing when appropriate to reduce radiation doses  as low as reasonably achievable.    HISTORY: LLQ abdominal pain    FINDINGS: Axial postcontrast imaging was performed with 100 mL Omnipaque 350 IV contrast .    CT ABDOMEN: The lung bases are clear. The liver, spleen and pancreas demonstrate normal enhancement. There is no focal mass or biliary duct dilatation.  The gallbladder and adrenal glands are normal.  The kidneys enhance symmetrically without hydronephrosis or calculi.    The patient has had a gastric bypass. There are no thick-walled or dilated bowel loops. There is no mesenteric or retroperitoneal adenopathy. The aorta is normal in caliber. There is an IVC filter in place.    CT PELVIS: The bladder and prostate gland are normal. There is no pelvic adenopathy. There are mild degenerative changes of the spine.    IMPRESSION: No acute abdominal or pelvic process    Prior gastric bypass    Electronically signed by:  Maryanne Morejon MD  3/29/2022 2:41 PM CDT Workstation: [id number]                             US Lower Extremity Veins Right (Final result)  Result time 03/29/22 14:32:55    Final result by Jordan Carnes MD (03/29/22 14:32:55)                 Narrative:    VENOUS DOPPLER ULTRASOUND RIGHT LOWER EXTREMITY    CLINICAL DATA: Right leg pain, groin erythema        FINDINGS: Color and duplex Doppler sonographic assessment with spectral analysis of the right lower extremity demonstrates no evidence of deep vein thrombosis. Normal color Doppler flow, augmentation, and compressibility are noted at all levels.    Prominent lymph node at the right groin measures 2.8 x 1.2 x 3.2 cm. In a region of reported erythema at the right groin, there is a subcutaneous branching linear hypoechoic region which measures 4.9 x 1.6 cm.  This is nonspecific, but could potentially reflect small abscess or hematoma. Correlate clinically.    IMPRESSION:  1. No evidence of DVT in the right lower extremity.  2. Small complex fluid collection at the right groin as described, in a region of reported erythema. Correlate clinically for possible small hematoma or abscess. Mildly enlarged right inguinal lymph node is probably benign/reactive.    Electronically signed by:  Jordan Carnes MD  3/29/2022 2:32 PM CDT Workstation: [id number]                             X-Ray Chest AP Portable (Final result)  Result time 03/29/22 12:46:34    Final result by Maryanne Morejon MD (03/29/22 12:46:34)                 Narrative:    XR CHEST 1 VIEW    CLINICAL HISTORY:  56 years Male Sepsis    COMPARISON: None    FINDINGS: Cardiomediastinal silhouette is within normal limits. Lungs are normally expanded with no airspace consolidation. No pleural effusion or pneumothorax. No acute osseous abnormality.    IMPRESSION: No acute pulmonary process.    Electronically signed by:  Maryanne Morejon MD  3/29/2022 12:46 PM CDT Workstation: [id number]                               Medications   amLODIPine tablet 10 mg (has no administration in time range)   aspirin chewable tablet 81 mg (has no administration in time range)   atorvastatin tablet 40 mg (40 mg Oral Given 3/29/22 2046)   sodium chloride 0.9% flush 10 mL (has no administration in time range)   HYDROcodone-acetaminophen 5-325 mg per tablet 1 tablet (1 tablet Oral Given 3/29/22 1807)   ondansetron disintegrating tablet 8 mg (has no administration in time range)   acetaminophen tablet 650 mg (has no administration in time range)   melatonin tablet 6 mg (has no administration in time range)   nystatin cream ( Topical (Top) Given 3/29/22 2046)   hydrALAZINE tablet 25 mg (25 mg Oral Given 3/29/22 1807)   clindamycin in D5W 600 mg/50 mL IVPB 600 mg (0 mg Intravenous Hold 3/29/22 2100)   iohexoL (OMNIPAQUE 350) injection 100  mL (100 mLs Intravenous Given 3/29/22 1426)   clindamycin in D5W 900 mg/50 mL IVPB 900 mg (0 mg Intravenous Stopped 3/29/22 1639)     Medical Decision Making:   History:   Old Medical Records: I decided to obtain old medical records.  Initial Assessment:   Patient has infection to groin area consistent with yeast, patient also has a red and swollen leg concerning for cellulitis or DVT.              Attending Attestation:             Attending ED Notes:   Patient's imaging shows findings concerning for possible abscess versus complex hematoma, given patient's swelling and erythema to the leg I am inclined to lean towards abscess, case was discussed with Dr. Street of General surgery he recommends keeping patient NPO and he will see patient for incision and drainage.  Patient consulted Internal Medicine for admission and was started on IV antibiotics.  Patient remains NPO.               Clinical Impression:   Final diagnoses:  [R53.83] Fatigue  [R52] Pain  [L03.90] Cellulitis, unspecified cellulitis site (Primary)          ED Disposition Condition    Admit               Vikas Carter MD  03/29/22 2125       Vikas Carter MD  03/29/22 2127

## 2022-03-29 NOTE — H&P
[facility] History & Physical Examination   Patient Name: Nithin Townsend  MRN: [id number]  Patient Class: IP- Inpatient   Admission Date: 3/29/2022 12:11 PM  Length of Stay: 0  Attending Physician:   Primary Care Provider: CAITLYN Avila  Face-to-Face encounter date: 03/29/2022  Code Status: Full Code  MPOA:  Chief Complaint: Wound Check (Patient reports redness and oozing to abdomen, also reports leg swelling )        Patient information was obtained from patient, past medical records and ER records.   HISTORY OF PRESENT ILLNESS:   Nithin Townsend is a 56 y.o. old  male who  has a past medical history of COVID-19, Hypertension, and Obese.. The patient presented to [facility] on 3/29/2022 with a primary complaint of Wound Check (Patient reports redness and oozing to abdomen, also reports leg swelling )  .     56 year old  male presents to the emergency room with redness and irritation to his right lower leg and bilateral groin regions.  He also noted increased swelling to his right leg over the past few days.    On December / 2021 the patient had an infected hematoma to his right upper thigh that was incised and drained by Dr. Chandler Street.  A consult was placed to Dr. Street and he agreed to see the patient today    Nonetheless the patient was also complaining of redness and irritation to his bilateral groin regions under his fat folds.  He has had yeast infections in the past.  He denies fever but did endorse chills no chest pain no lightheadedness dizziness shortness of breath or weakness.  He describes symptoms as moderate to severe severity with no exacerbating or alleviating factors  REVIEW OF SYSTEMS:   10 Point Review of System was performed and was found to be negative except for that mentioned already in the HPI and   Review of Systems (Negative unless checked off)  Review of Systems   Constitutional: Positive for chills.   HENT: Negative.    Eyes:  Negative.    Respiratory: Negative.    Cardiovascular: Positive for leg swelling.   Gastrointestinal: Negative.    Genitourinary: Negative.    Musculoskeletal: Negative.    Skin:        Redness to right lower leg probable cellulitis    Small abscess to rt groin region / H/O ID of abscess to right groin 12/2021     Extensive redness with tiny papula lesions to bilateral groin regions suggestive of fungal infection   Neurological: Negative.    Endo/Heme/Allergies: Negative.    Psychiatric/Behavioral: Negative.            PAST MEDICAL HISTORY:     Past Medical History:   Diagnosis Date    COVID-19     Hypertension     Obese        PAST SURGICAL HISTORY:     Past Surgical History:   Procedure Laterality Date    gatric bypass      HERNIA REPAIR      INCISION AND DRAINAGE Right 12/09/2021    Procedure: INCISION AND DRAINAGE, HEMATOMA, SEROMA, OR FLUID COLLECTION;  Surgeon: Chandler Street MD;  Location: [facility];  Service: General;  Laterality: Right;       ALLERGIES:   Patient has no known allergies.    FAMILY HISTORY:     Family History   Problem Relation Age of Onset    Hypertension Mother     Diabetes Mother     Hyperlipidemia Father     Heart disease Father     Hypertension Father        SOCIAL HISTORY:     Social History     Tobacco Use    Smoking status: Never Smoker    Smokeless tobacco: Never Used   Substance Use Topics    Alcohol use: Not Currently        Social History     Substance and Sexual Activity   Sexual Activity Not Currently        HOME MEDICATIONS:     Prior to Admission medications    Medication Sig Start Date End Date Taking? Authorizing Provider   amLODIPine (NORVASC) 10 MG tablet Take 1 tablet (10 mg total) by mouth once daily. 6/23/21 10/21/21 Yes Isaiah Velasquez MD   aspirin 81 MG Chew Take 1 tablet (81 mg total) by mouth once daily. 6/23/21 10/21/21 Yes Isaiah Velasquez MD   atorvastatin (LIPITOR) 40 MG tablet Take 1 tablet (40 mg total) by mouth every evening. 6/23/21 6/23/22  Isaiah JASSO  "MD Eva   HYDROcodone-acetaminophen (NORCO) 5-325 mg per tablet Take 1 tablet by mouth every 4 (four) hours as needed for Pain. 2/28/22   LIT Mckoy         PHYSICAL EXAM:   BP (!) 186/81 (BP Location: Left arm, Patient Position: Sitting)   Pulse 75   Temp 99.1 °F (37.3 °C) (Oral)   Resp 18   Ht 6' 3" (1.905 m)   Wt (!) 147.4 kg (325 lb)   SpO2 100%   BMI 40.62 kg/m²   Vitals Reviewed  General appearance: Well-developed, well-nourished male in no apparent distress.  Skin: No Rash.  Yeast irritation to groin region bilaterally/redness and erythema with swelling to right lower extremity  Neuro: Motor and sensory exams grossly intact. Good tone. Power in all 4 extremities 5/5.   HENT: Atraumatic head. Moist mucous membranes of oral cavity.  Eyes: Normal extraocular movements.   Neck: Supple. No evidence of lymphadenopathy. No thyroidomegaly.  Lungs: Clear to auscultation bilaterally. No wheezing present.   Heart: Regular rate and rhythm. S1 and S2 present with no murmurs/gallop/rub.  Positive pedal edema. No JVD present.   Abdomen: Soft, non-distended, non-tender. No rebound tenderness/guarding. No masses or organomegaly. Bowel sounds are normal. Bladder is not palpable.   Extremities: No cyanosis, clubbing, positive edema.  Right lower extremity swelling greater than the left side  Psych/mental status: Alert and oriented. Cooperative. Responds appropriately to questions.   EMERGENCY DEPARTMENT LABS AND IMAGING:   Following labs were Reviewed   Recent Labs   Lab 03/29/22  1210   WBC 10.47   HGB 13.5*   HCT 41.6   *   CALCIUM 8.1*   ALBUMIN 3.2*   PROT 7.0   *   K 3.8   CO2 28   CL 96   BUN 15   CREATININE 1.3   ALKPHOS 73   ALT 10   AST 17   BILITOT 0.9         BMP:   Recent Labs   Lab 03/29/22  1210      *   K 3.8   CL 96   CO2 28   BUN 15   CREATININE 1.3   CALCIUM 8.1*   , CMP   Recent Labs   Lab 03/29/22  1210   *   K 3.8   CL 96   CO2 28      BUN 15 "   CREATININE 1.3   CALCIUM 8.1*   PROT 7.0   ALBUMIN 3.2*   BILITOT 0.9   ALKPHOS 73   AST 17   ALT 10   ANIONGAP 8   ESTGFRAFRICA >60.0   EGFRNONAA >60.0   , CBC   Recent Labs   Lab 03/29/22  1210   WBC 10.47   HGB 13.5*   HCT 41.6   *   , INR   Lab Results   Component Value Date    INR 1.2 03/29/2022    INR 1.0 06/22/2021   , Lipid Panel   Lab Results   Component Value Date    CHOL 134 06/22/2021    HDL 29 (L) 06/22/2021    LDLCALC 82.4 06/22/2021    TRIG 113 06/22/2021    CHOLHDL 21.6 06/22/2021   , Troponin No results for input(s): TROPONINI in the last 168 hours., A1C: No results for input(s): HGBA1C in the last 4320 hours. and All labs within the past 24 hours have been reviewed  Microbiology Results (last 7 days)     Procedure Component Value Units Date/Time    Blood culture x two cultures. Draw prior to antibiotics. [[id number]] Collected: 03/29/22 1250    Order Status: Sent Specimen: Blood from Peripheral, Antecubital, Left Updated: 03/29/22 1323    Blood culture x two cultures. Draw prior to antibiotics. [[id number]] Collected: 03/29/22 1210    Order Status: Sent Specimen: Blood from Peripheral, Antecubital, Right Updated: 03/29/22 1224        CT Abdomen Pelvis With Contrast   Final Result      US Lower Extremity Veins Right   Final Result      X-Ray Chest AP Portable   Final Result        X-Ray Knee Complete 4 or more Views Left    Result Date: 2/28/2022  CLINICAL HISTORY: 56 years (1965) Male pain Knee Pain TECHNIQUE: XR KNEE 4 OR MORE VIEWS. 4 image(s) obtained. COMPARISON: None available. FINDINGS: No acute fracture or dislocation. Moderate medial and patellofemoral compartment joint space narrowing with mild lateral compartment joint space narrowing and moderate-sized tricompartment osteophytes in keeping with tricompartment osteoarthrosis. There is a moderate-sized knee joint effusion in the suprapatellar recess. Soft tissues are radiographically within normal limits with no radiopaque  foreign body seen. IMPRESSION: 1. No acute fracture or dislocation. 2. Small knee joint effusion. 3. Tricompartment osteoarthrosis. . Electronically signed by:  Wilian Gomez MD  2/28/2022 3:31 PM [facility] Workstation: [id number]    CT Abdomen Pelvis With Contrast    Result Date: 3/29/2022  All CT scans at this facility used dose modulation, iterative reconstruction and/or weight-based dosing when appropriate to reduce radiation doses  as low as reasonably achievable. HISTORY: LLQ abdominal pain FINDINGS: Axial postcontrast imaging was performed with 100 mL Omnipaque 350 IV contrast . CT ABDOMEN: The lung bases are clear. The liver, spleen and pancreas demonstrate normal enhancement. There is no focal mass or biliary duct dilatation. The gallbladder and adrenal glands are normal. The kidneys enhance symmetrically without hydronephrosis or calculi. The patient has had a gastric bypass. There are no thick-walled or dilated bowel loops. There is no mesenteric or retroperitoneal adenopathy. The aorta is normal in caliber. There is an IVC filter in place. CT PELVIS: The bladder and prostate gland are normal. There is no pelvic adenopathy. There are mild degenerative changes of the spine. IMPRESSION: No acute abdominal or pelvic process Prior gastric bypass Electronically signed by:  Maryanne Morejon MD  3/29/2022 2:41 PM CDT Workstation: [id number]    X-Ray Chest AP Portable    Result Date: 3/29/2022  XR CHEST 1 VIEW CLINICAL HISTORY: 56 years Male Sepsis COMPARISON: None FINDINGS: Cardiomediastinal silhouette is within normal limits. Lungs are normally expanded with no airspace consolidation. No pleural effusion or pneumothorax. No acute osseous abnormality. IMPRESSION: No acute pulmonary process. Electronically signed by:  Maryanne Morejon MD  3/29/2022 12:46 PM CDT Workstation: [id number]    US Lower Extremity Veins Left    Result Date: 2/28/2022  US LOWER EXTREMITY VEINS LIMITED FOLLOW-UP UNILATERAL CLINICAL HISTORY:  56 years Male left knee and leg pain . fall in Nov 2021 FINDINGS: Grayscale, color and spectral Doppler analysis of the left lower extremity deep venous system was performed. There is normal compressibility, with normal flow by color and spectral Doppler analysis in the left lower extremity deep venous system, with normal augmentation and no evidence of deep venous thrombosis. Incidentally noted left knee joint effusion with synovitis/debris. IMPRESSION: Negative for left lower DVT. Left knee joint effusion. Electronically signed by:  Petr Adams MD  2/28/2022 5:33 PM CST Workstation: [id number]    US Lower Extremity Veins Right    Result Date: 3/29/2022  VENOUS DOPPLER ULTRASOUND RIGHT LOWER EXTREMITY CLINICAL DATA: Right leg pain, groin erythema FINDINGS: Color and duplex Doppler sonographic assessment with spectral analysis of the right lower extremity demonstrates no evidence of deep vein thrombosis. Normal color Doppler flow, augmentation, and compressibility are noted at all levels. Prominent lymph node at the right groin measures 2.8 x 1.2 x 3.2 cm. In a region of reported erythema at the right groin, there is a subcutaneous branching linear hypoechoic region which measures 4.9 x 1.6 cm. This is nonspecific, but could potentially reflect small abscess or hematoma. Correlate clinically. IMPRESSION: 1. No evidence of DVT in the right lower extremity. 2. Small complex fluid collection at the right groin as described, in a region of reported erythema. Correlate clinically for possible small hematoma or abscess. Mildly enlarged right inguinal lymph node is probably benign/reactive. Electronically signed by:  Jordan Carnes MD  3/29/2022 2:32 PM CDT Workstation: [id number]    I personally reviewed and agree with above findings    ASSESSMENT & PLAN:   Nithin Townsend is a 56 y.o. male admitted for    1. Cellulitis to Right Lower leg  - IV Antibiotic with Clindamycin  -ID Consult  - trend inflammatory  markers  -  trend Lactic acid (repeat level WNL at 1.4)  - IV hydration  - Ultrasound negative for DVT     2. Possible Groin Abscess  - consult Dr. Street    3. Essential HTN  - continue home meds    4. Obesity-BMI:40.62  -follow up with PCP with discharge for wt reduction plan    5. Intertrigo Dermatitis to bilateral Groin regions  - Nystatin cream   - ID consult    DVT Prophylaxis: will be placed on Lovenox for DVT prophylaxis and will be advised to be as mobile as possible and sit in a chair as tolerated.   _____________________________________________________________  Face-to-Face encounter date: 03/29/2022  Encounter included review of the medical records, interviewing and examining the patient face-to-face, discussion with family and other health care providers including emergency medicine physician, admission orders, interpreting lab/test results and formulating a plan of care.   Medical Decision Making during this encounter was  [_] Low Complexity  [_] Moderate Complexity  [x] High Complexity  _________________________________________________________________________________    INPATIENT LIST OF MEDICATIONS     Current Facility-Administered Medications:     acetaminophen tablet 650 mg, 650 mg, Oral, Q8H PRN, Roxanne Schroeder NP    [START ON 3/30/2022] amLODIPine tablet 10 mg, 10 mg, Oral, Daily, Roxanne Schroeder NP    [START ON 3/30/2022] aspirin chewable tablet 81 mg, 81 mg, Oral, Daily, Roxanne Schroeder NP    atorvastatin tablet 40 mg, 40 mg, Oral, QHS, Roxanne Schroeder NP    HYDROcodone-acetaminophen 5-325 mg per tablet 1 tablet, 1 tablet, Oral, Q6H PRN, Roxanne Schroeder NP    melatonin tablet 6 mg, 6 mg, Oral, Nightly PRN, Roxanne Schroeder NP    ondansetron disintegrating tablet 8 mg, 8 mg, Oral, Q8H PRN, Roxanne Schroeder NP    sodium chloride 0.9% flush 10 mL, 10 mL, Intravenous, Q12H PRN, Roxanne Schroeder NP    Current Outpatient Medications:     amLODIPine (NORVASC) 10 MG tablet, Take 1 tablet (10 mg total) by mouth once  daily., Disp: 30 tablet, Rfl: 3    aspirin 81 MG Chew, Take 1 tablet (81 mg total) by mouth once daily., Disp: 30 tablet, Rfl: 3    atorvastatin (LIPITOR) 40 MG tablet, Take 1 tablet (40 mg total) by mouth every evening., Disp: 90 tablet, Rfl: 0    HYDROcodone-acetaminophen (NORCO) 5-325 mg per tablet, Take 1 tablet by mouth every 4 (four) hours as needed for Pain., Disp: 8 tablet, Rfl: 0      Scheduled Meds:   [START ON 3/30/2022] amLODIPine  10 mg Oral Daily    [START ON 3/30/2022] aspirin  81 mg Oral Daily    atorvastatin  40 mg Oral QHS     Continuous Infusions:  PRN Meds:.      Roxanne Schroeder  [facility] Hospitalist NP  03/29/2022

## 2022-03-29 NOTE — ED NOTES
Adult Physical Assessment  LOC: Patient is awake, alert and oriented X3; aware of environment with an appropriate affect and speech.  APPEARANCE: No acute distress noted other than initial complaint  SKIN: skin breakdown to lower abdominal skin folds.   MUSCULOSKELETAL:  Normal ROM noted; moves all extremities equally; redness to right thigh  RESPIRATORY: Airway is open and patent, unlabored, spontaneous bilateral respirations; normal effort and rate.   CARDIAC: Normal rate and rhythm, no peripheral edema noted, capillary refill < 3 seconds.   ABDOMEN/GI: Soft and non-tender upon palpation, no distention noted.   URINARY: Normal frequency, denies retention   PULSES: 2+; symmetrical in all 4 extremities  NEUROLOGIC: PERRLA, symmetrical facial expression; behavior appropriate to situation, follows commands, facial expressions symmetrical, bilateral hand grasp, equal and even, purposeful motor response noted, normal sensation to all 4 extremities.    VSS. Call light within reach, continuous monitoring in place, remains in stretcher at 45 degree angle with wheels locked. ED workup in progress. Will continue to monitor.

## 2022-03-30 LAB
ANION GAP SERPL CALC-SCNC: 8 MMOL/L (ref 8–16)
BACTERIA #/AREA URNS HPF: NEGATIVE /HPF
BASOPHILS # BLD AUTO: 0.02 K/UL (ref 0–0.2)
BASOPHILS NFR BLD: 0.2 % (ref 0–1.9)
BILIRUB UR QL STRIP: NEGATIVE
BUN SERPL-MCNC: 16 MG/DL (ref 6–20)
CALCIUM SERPL-MCNC: 7.7 MG/DL (ref 8.7–10.5)
CHLORIDE SERPL-SCNC: 97 MMOL/L (ref 95–110)
CLARITY UR: ABNORMAL
CO2 SERPL-SCNC: 26 MMOL/L (ref 23–29)
COLOR UR: YELLOW
CREAT SERPL-MCNC: 1.2 MG/DL (ref 0.5–1.4)
DIFFERENTIAL METHOD: ABNORMAL
EOSINOPHIL # BLD AUTO: 0 K/UL (ref 0–0.5)
EOSINOPHIL NFR BLD: 0.4 % (ref 0–8)
ERYTHROCYTE [DISTWIDTH] IN BLOOD BY AUTOMATED COUNT: 12.5 % (ref 11.5–14.5)
EST. GFR  (AFRICAN AMERICAN): >60 ML/MIN/1.73 M^2
EST. GFR  (NON AFRICAN AMERICAN): >60 ML/MIN/1.73 M^2
GLUCOSE SERPL-MCNC: 92 MG/DL (ref 70–110)
GLUCOSE UR QL STRIP: NEGATIVE
HCT VFR BLD AUTO: 36.1 % (ref 40–54)
HGB BLD-MCNC: 11.8 G/DL (ref 14–18)
HGB UR QL STRIP: ABNORMAL
HYALINE CASTS #/AREA URNS LPF: 1 /LPF
IMM GRANULOCYTES # BLD AUTO: 0.07 K/UL (ref 0–0.04)
IMM GRANULOCYTES NFR BLD AUTO: 0.7 % (ref 0–0.5)
KETONES UR QL STRIP: NEGATIVE
LEUKOCYTE ESTERASE UR QL STRIP: NEGATIVE
LYMPHOCYTES # BLD AUTO: 0.5 K/UL (ref 1–4.8)
LYMPHOCYTES NFR BLD: 5.3 % (ref 18–48)
MCH RBC QN AUTO: 28.7 PG (ref 27–31)
MCHC RBC AUTO-ENTMCNC: 32.7 G/DL (ref 32–36)
MCV RBC AUTO: 88 FL (ref 82–98)
MICROSCOPIC COMMENT: ABNORMAL
MONOCYTES # BLD AUTO: 0.3 K/UL (ref 0.3–1)
MONOCYTES NFR BLD: 3.1 % (ref 4–15)
NEUTROPHILS # BLD AUTO: 9.2 K/UL (ref 1.8–7.7)
NEUTROPHILS NFR BLD: 90.3 % (ref 38–73)
NITRITE UR QL STRIP: NEGATIVE
NRBC BLD-RTO: 0 /100 WBC
PH UR STRIP: 8 [PH] (ref 5–8)
PLATELET # BLD AUTO: 113 K/UL (ref 150–450)
PMV BLD AUTO: 8.7 FL (ref 9.2–12.9)
POTASSIUM SERPL-SCNC: 3.8 MMOL/L (ref 3.5–5.1)
PROT UR QL STRIP: ABNORMAL
RBC # BLD AUTO: 4.11 M/UL (ref 4.6–6.2)
RBC #/AREA URNS HPF: >100 /HPF (ref 0–4)
SODIUM SERPL-SCNC: 131 MMOL/L (ref 136–145)
SP GR UR STRIP: >1.03 (ref 1–1.03)
SQUAMOUS #/AREA URNS HPF: 3 /HPF
URN SPEC COLLECT METH UR: ABNORMAL
UROBILINOGEN UR STRIP-ACNC: >=8 EU/DL
WBC # BLD AUTO: 10.14 K/UL (ref 3.9–12.7)
WBC #/AREA URNS HPF: 6 /HPF (ref 0–5)

## 2022-03-30 PROCEDURE — 63600175 PHARM REV CODE 636 W HCPCS: Performed by: NURSE PRACTITIONER

## 2022-03-30 PROCEDURE — 80048 BASIC METABOLIC PNL TOTAL CA: CPT | Performed by: NURSE PRACTITIONER

## 2022-03-30 PROCEDURE — 81001 URINALYSIS AUTO W/SCOPE: CPT | Performed by: PHYSICIAN ASSISTANT

## 2022-03-30 PROCEDURE — 12000002 HC ACUTE/MED SURGE SEMI-PRIVATE ROOM

## 2022-03-30 PROCEDURE — 25000003 PHARM REV CODE 250: Performed by: NURSE PRACTITIONER

## 2022-03-30 PROCEDURE — 99222 1ST HOSP IP/OBS MODERATE 55: CPT | Mod: ,,, | Performed by: INTERNAL MEDICINE

## 2022-03-30 PROCEDURE — 99222 PR INITIAL HOSPITAL CARE,LEVL II: ICD-10-PCS | Mod: ,,, | Performed by: INTERNAL MEDICINE

## 2022-03-30 PROCEDURE — 85025 COMPLETE CBC W/AUTO DIFF WBC: CPT | Performed by: NURSE PRACTITIONER

## 2022-03-30 PROCEDURE — 25000003 PHARM REV CODE 250: Performed by: INTERNAL MEDICINE

## 2022-03-30 PROCEDURE — 63700000 PHARM REV CODE 250 ALT 637 W/O HCPCS: Performed by: INTERNAL MEDICINE

## 2022-03-30 PROCEDURE — 36415 COLL VENOUS BLD VENIPUNCTURE: CPT | Performed by: NURSE PRACTITIONER

## 2022-03-30 RX ORDER — POTASSIUM CHLORIDE 20 MEQ/1
40 TABLET, EXTENDED RELEASE ORAL
Status: DISCONTINUED | OUTPATIENT
Start: 2022-03-30 | End: 2022-04-01 | Stop reason: HOSPADM

## 2022-03-30 RX ORDER — MAGNESIUM SULFATE HEPTAHYDRATE 40 MG/ML
2 INJECTION, SOLUTION INTRAVENOUS
Status: DISCONTINUED | OUTPATIENT
Start: 2022-03-30 | End: 2022-04-01 | Stop reason: HOSPADM

## 2022-03-30 RX ORDER — POTASSIUM CHLORIDE 20 MEQ/1
20 TABLET, EXTENDED RELEASE ORAL
Status: DISCONTINUED | OUTPATIENT
Start: 2022-03-30 | End: 2022-04-01 | Stop reason: HOSPADM

## 2022-03-30 RX ORDER — CHLORHEXIDINE GLUCONATE ORAL RINSE 1.2 MG/ML
15 SOLUTION DENTAL 2 TIMES DAILY
Status: DISCONTINUED | OUTPATIENT
Start: 2022-03-30 | End: 2022-04-01 | Stop reason: HOSPADM

## 2022-03-30 RX ORDER — MAGNESIUM SULFATE HEPTAHYDRATE 40 MG/ML
4 INJECTION, SOLUTION INTRAVENOUS
Status: DISCONTINUED | OUTPATIENT
Start: 2022-03-30 | End: 2022-04-01 | Stop reason: HOSPADM

## 2022-03-30 RX ORDER — LANOLIN ALCOHOL/MO/W.PET/CERES
800 CREAM (GRAM) TOPICAL
Status: DISCONTINUED | OUTPATIENT
Start: 2022-03-30 | End: 2022-04-01 | Stop reason: HOSPADM

## 2022-03-30 RX ORDER — FLUCONAZOLE 100 MG/1
400 TABLET ORAL DAILY
Status: DISCONTINUED | OUTPATIENT
Start: 2022-03-30 | End: 2022-04-01 | Stop reason: HOSPADM

## 2022-03-30 RX ORDER — MAGNESIUM SULFATE 1 G/100ML
1 INJECTION INTRAVENOUS
Status: DISCONTINUED | OUTPATIENT
Start: 2022-03-30 | End: 2022-04-01 | Stop reason: HOSPADM

## 2022-03-30 RX ADMIN — CLINDAMYCIN IN 5 PERCENT DEXTROSE 600 MG: 12 INJECTION, SOLUTION INTRAVENOUS at 05:03

## 2022-03-30 RX ADMIN — SODIUM CHLORIDE: 0.9 INJECTION, SOLUTION INTRAVENOUS at 12:03

## 2022-03-30 RX ADMIN — FLUCONAZOLE 400 MG: 100 TABLET ORAL at 01:03

## 2022-03-30 RX ADMIN — CHLORHEXIDINE GLUCONATE 15 ML: 1.2 RINSE ORAL at 08:03

## 2022-03-30 RX ADMIN — ENOXAPARIN SODIUM 40 MG: 40 INJECTION SUBCUTANEOUS at 11:03

## 2022-03-30 RX ADMIN — ACETAMINOPHEN 650 MG: 325 TABLET, FILM COATED ORAL at 08:03

## 2022-03-30 RX ADMIN — CLINDAMYCIN IN 5 PERCENT DEXTROSE 600 MG: 12 INJECTION, SOLUTION INTRAVENOUS at 09:03

## 2022-03-30 RX ADMIN — CLINDAMYCIN IN 5 PERCENT DEXTROSE 600 MG: 12 INJECTION, SOLUTION INTRAVENOUS at 01:03

## 2022-03-30 RX ADMIN — AMLODIPINE BESYLATE 10 MG: 5 TABLET ORAL at 08:03

## 2022-03-30 RX ADMIN — ENOXAPARIN SODIUM 40 MG: 40 INJECTION SUBCUTANEOUS at 12:03

## 2022-03-30 RX ADMIN — NYSTATIN: 100000 CREAM TOPICAL at 08:03

## 2022-03-30 RX ADMIN — ATORVASTATIN CALCIUM 40 MG: 40 TABLET, FILM COATED ORAL at 08:03

## 2022-03-30 RX ADMIN — NYSTATIN: 100000 CREAM TOPICAL at 09:03

## 2022-03-30 NOTE — CONSULTS
Consult Note  Infectious Disease    Reason for Consult:  Cellulitis right leg and Candida intertrigo, possible abscess    HPI: Nithin Townsend is a 56 y.o. male Presented to the emergency room yesterday for redness of his abdomen and weeping fluid, redness right thigh, history of infected right thigh hematoma, with Staph aureus and E coli, 12/2021, requiring surgical drainage.  Since that time he was seen for ED visit for knee pain 2/28/22, left knee joint effusion on US and xray.  Yesterday was found have a temperature of 99.1°, erythema and foul odor to the in for abdominal folds and groin consistent with candidiasis, and redness of the right leg worrisome for cellulitis.  CT scan of the abdomen and pelvis was negative for acute finding.  White blood cells were normal, lactic acid was normal.  He was admitted to the Hospital Medicine Service and placed on clindamycin.  He was seen by General surgery, Dr. Street would perform his previous I and D because of a question of a small abscess in the right proximal thigh but clinically there was nothing to drain.  He is also receiving nystatin cream to bilateral groin regions.  Urinalysis yesterday had greater than 100 red blood cells per high-power field.      Review of patient's allergies indicates:  No Known Allergies  Past Medical History:   Diagnosis Date    Abscess of leg 12/9/2021    COVID-19     Hypertension     Obese      Past Surgical History:   Procedure Laterality Date    gatric bypass      HERNIA REPAIR      INCISION AND DRAINAGE Right 12/09/2021    Procedure: INCISION AND DRAINAGE, HEMATOMA, SEROMA, OR FLUID COLLECTION;  Surgeon: Chandler Street MD;  Location: [facility];  Service: General;  Laterality: Right;     Social History     Socioeconomic History    Marital status:    Tobacco Use    Smoking status: Never Smoker    Smokeless tobacco: Never Used   Substance and Sexual Activity    Alcohol use: Not Currently    Drug use: Never    Sexual  activity: Not Currently     Family History   Problem Relation Age of Onset    Hypertension Mother     Diabetes Mother     Hyperlipidemia Father     Heart disease Father     Hypertension Father          Review of Systems:   He did have chills 3 days ago   He has been 300-320 lb the since his gastric bypass at which time he was over 500 lb     No pain in mouth or throat   No chest pain,    No  , shortness of breath,    No nausea, vomiting, diarrhea, , or focal abd pain,    No dysuria, or scrotal pain or swelling     No swelling of joints, redness of joints, injuries, but he did have acute onset of swelling right leg x 2 days  No unusual headaches, dizziness, vertigo, numbness, paresthesias, neuropathy, falls     No diabetes   No bleeding, , anemia, malignancy, unusual bruising  He indicates that his groin rash has only been present for 2-3 days. 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      Implants: none       EXAM & DIAGNOSTICS REVIEWED:   Vitals:     Temp:  [97.6 °F (36.4 °C)-100.3 °F (37.9 °C)]   Temp: 100.3 °F (37.9 °C) (03/30/22 0746)  Pulse: 74 (03/30/22 0746)  Resp: 18 (03/30/22 0746)  BP: (!) 145/73 (03/30/22 0746)  SpO2: 95 % (03/30/22 0746)    Intake/Output Summary (Last 24 hours) at 3/30/2022 1117  Last data filed at 3/30/2022 0619  Gross per 24 hour   Intake 170 ml   Output 825 ml   Net -655 ml       General:  In NAD. Alert and attentive, cooperative, comfortable  Eyes:  Anicteric, PERRL, EOMI  ENT:  No ulcers, exudates, thrush, nares patent,    Neck:  Supple,   Lungs: Clear, no consolidation, rales, wheezes, rub  Heart:  RRR, no gallop/murmur/rub noted  Abd:  Soft, obese, NT, ND, normal BS, no masses or organomegaly appreciated.  :  Voids/, there is some involvement of foreskin with candida  Musc:  Joints without effusion, swelling, erythema, synovitis, muscle wasting.   Skin:  Severe bilateral  groin candidiasis    Superimposed staph folliculitis on abdomen, groin, medial thighs    Cellulitis right lower extremity, see photo below   I do not appreciate any abscess but exam sensitivity is limited by redundant soft tissue.  Neuro:             Alert, attentive, speech fluent, face symmetric, moves all extremities, no focal weakness. Ambulatory  Psych: Calm, cooperative     Extrem: Chronic LE edema with brown staining of venous stasis BLE. Right leg has confluent erythema around medial foot, ankle and lower leg, becoming patchy near the knee. No lymphangitis,  warm and well perfused. Moderate small varicosities about both lower legs.  VAD:       Isolation:    Wound: Scar right medial thigh from 12/2021 surgery.          General Labs reviewed:  Recent Labs   Lab 03/29/22  1210 03/30/22  0519   WBC 10.47 10.14   HGB 13.5* 11.8*   HCT 41.6 36.1*   * 113*       Recent Labs   Lab 03/29/22 1210 03/30/22  0519   * 131*   K 3.8 3.8   CL 96 97   CO2 28 26   BUN 15 16   CREATININE 1.3 1.2   CALCIUM 8.1* 7.7*   PROT 7.0  --    BILITOT 0.9  --    ALKPHOS 73  --    ALT 10  --    AST 17  --      Recent Labs   Lab 03/29/22  1759   CRP 25.21*         Micro:  Microbiology Results (last 7 days)     Procedure Component Value Units Date/Time    Blood culture x two cultures. Draw prior to antibiotics. [[id number]] Collected: 03/29/22 1250    Order Status: Completed Specimen: Blood from Peripheral, Antecubital, Left Updated: 03/29/22 2117     Blood Culture, Routine No Growth to date    Narrative:      Aerobic and anaerobic    Blood culture x two cultures. Draw prior to antibiotics. [[id number]] Collected: 03/29/22 1210    Order Status: Completed Specimen: Blood from Peripheral, Antecubital, Right Updated: 03/29/22 1917     Blood Culture, Routine No Growth to date    Narrative:      Aerobic and anaerobic          Imaging Reviewed:   Ultrasound  FINDINGS: Color and duplex Doppler sonographic assessment with spectral  analysis of the right lower extremity demonstrates no evidence of deep vein thrombosis. Normal color Doppler flow, augmentation, and compressibility are noted at all levels.     Prominent lymph node at the right groin measures 2.8 x 1.2 x 3.2 cm. In a region of reported erythema at the right groin, there is a subcutaneous branching linear hypoechoic region which measures 4.9 x 1.6 cm. This is nonspecific, but could potentially reflect small abscess or hematoma. Correlate clinically.     IMPRESSION:  1. No evidence of DVT in the right lower extremity.  2. Small complex fluid collection at the right groin as described, in a region of reported erythema. Correlate clinically for possible small hematoma or abscess. Mildly enlarged right inguinal lymph node is probably benign/reactive.     CT scan of the abdomen and pelvis 3/29 was negative    Cardiology:    IMPRESSION & PLAN   1. Cellulitis right leg, c/w Staph or Strep (presentation is more streptococcal) but he does have typical Staph folliculitis in groin  2. Severe Candida intertrigo, bilateral groin, medial thigh, and some in genital skin  3. Morbid obesity  4. Venous stasis at baseline      Recommendations:  Clindamycin is reasonable for his cellulitis and folliculitis. Would switch to zyvox if slow to improve  Continue nystatin cream bid, and will add oral diflucan  Bid showers with hibiclens, pat dry, apply nystatin, and separate skin folds with soft cloth(pillowcase) or ABD pads  Intranasal bactroban and chlorhexidine mouthwash as part of decolonization effort    D/w patient means to reduce relapse of candida infection    Medical Decision Making during this encounter was  [_] Low Complexity  [_] Moderate Complexity  [xx  ] High Complexity

## 2022-03-30 NOTE — PROGRESS NOTES
Pt seen and examined  No significant changes in condition.    No fluctuance or abscess noted.    Continued cellulitis and discomfort in lower R leg    NO surgical plans at present  COnt abx per ID  F/u with me prn

## 2022-03-30 NOTE — PROGRESS NOTES
[facility] Medicine  Progress Note    Patient name: Nithin Townsend  MRN: [id number]  Admit Date: 3/29/2022   LOS: 1 day     SUBJECTIVE:     Principal problem: Cellulitis    Interval History:  Right lower extremity redness involving the groin and shin is about the same. Low grade temp of 100.3F this morning.     Scheduled Meds:   amLODIPine  10 mg Oral Daily    aspirin  81 mg Oral Daily    atorvastatin  40 mg Oral QHS    chlorhexidine  15 mL Mouth/Throat BID    clindamycin (CLEOCIN) IVPB  600 mg Intravenous Q8H    enoxparin  40 mg Subcutaneous Q24H    fluconazole  400 mg Oral Daily    nystatin   Topical (Top) BID     Continuous Infusions:  PRN Meds:acetaminophen, hydrALAZINE, HYDROcodone-acetaminophen, melatonin, ondansetron, sodium chloride 0.9%    Review of patient's allergies indicates:  No Known Allergies    Review of Systems: As per interval history    OBJECTIVE:     Vital Signs (Most Recent)  Temp: 97.3 °F (36.3 °C) (03/30/22 1147)  Pulse: 75 (03/30/22 1147)  Resp: 18 (03/30/22 1147)  BP: (!) 168/82 (03/30/22 1147)  SpO2: 97 % (03/30/22 1147)    Vital Signs Range (Last 24H):  Temp:  [97.3 °F (36.3 °C)-100.3 °F (37.9 °C)]   Pulse:  [68-82]   Resp:  [18-25]   BP: (113-198)/(65-86)   SpO2:  [95 %-99 %]     I & O (Last 24H):    Intake/Output Summary (Last 24 hours) at 3/30/2022 1244  Last data filed at 3/30/2022 0619  Gross per 24 hour   Intake 170 ml   Output 825 ml   Net -655 ml       Physical Exam:  General: Patient resting comfortably in no acute distress. Appears as stated age. Calm  Eyes: No conjunctival injection. No scleral icterus.  ENT: Hearing grossly intact. No discharge from ears. No nasal discharge.   CVS: RRR. No LE edema BL  Lungs:  No tachypnea or accessory muscle use.  Clear to auscultation bilaterally  Abdomen:  Soft, nontender and nondistended.  No organomegaly  Neuro: AOx3. Moves all extremities. Follows commands. Responds appropriately   Skin:  Bilateral groin  erythema with moisture associated dermatitis. Chronic venous stasis dermatitis. Right leg erythema noted.     Laboratory:  I have reviewed all pertinent lab results within the past 24 hours.  CBC:   Recent Labs   Lab 03/30/22 0519   WBC 10.14   RBC 4.11*   HGB 11.8*   HCT 36.1*   *   MCV 88   MCH 28.7   MCHC 32.7     CMP:   Recent Labs   Lab 03/29/22  1210 03/30/22 0519    92   CALCIUM 8.1* 7.7*   ALBUMIN 3.2*  --    PROT 7.0  --    * 131*   K 3.8 3.8   CO2 28 26   CL 96 97   BUN 15 16   CREATININE 1.3 1.2   ALKPHOS 73  --    ALT 10  --    AST 17  --    BILITOT 0.9  --      Microbiology Results (last 7 days)     Procedure Component Value Units Date/Time    Blood culture x two cultures. Draw prior to antibiotics. [[id number]] Collected: 03/29/22 1250    Order Status: Completed Specimen: Blood from Peripheral, Antecubital, Left Updated: 03/29/22 2117     Blood Culture, Routine No Growth to date    Narrative:      Aerobic and anaerobic    Blood culture x two cultures. Draw prior to antibiotics. [[id number]] Collected: 03/29/22 1210    Order Status: Completed Specimen: Blood from Peripheral, Antecubital, Right Updated: 03/29/22 1917     Blood Culture, Routine No Growth to date    Narrative:      Aerobic and anaerobic          Diagnostic Results:  Labs: Reviewed  US: Reviewed  CT: Reviewed    ASSESSMENT/PLAN:     Active Hospital Problems    Diagnosis  POA    *Cellulitis [L03.90]  Yes    HTN (hypertension) [I10]  Yes    Severe obesity (BMI >= 40) [E66.01]  Yes    Thrombocytopenia [D69.6]  Yes    Lactic acid blood increased [R79.89]  Yes      Resolved Hospital Problems   No resolved problems to display.     Plan:   Continue clindamycin for non-purulent cellulitis of right lower extremity  Reviewed old cultures from December 2021 from right thigh cellulitis; grew MSSA and E coli  Fluconazole for candidal intertrigo in addition to topical nystatin  Appreciate input from ID and General  surgery  Discussed case with General surgery; no drainable fluid collection    Continue home medications for chronic medical conditions including amlodipine and statin    VTE Risk Mitigation (From admission, onward)         Ordered     enoxaparin injection 40 mg  Every 24 hours (non-standard times)         03/29/22 2345     IP VTE LOW RISK PATIENT  Once         03/29/22 1630                    Department Hospital Medicine  [facility]  Skip Mccray MD  Date of service: 03/30/2022        Please note: This note was transcribed using voice recognition software. Because of this technology, there are often uinintended grammatical, spelling, and other transcription errors. Please disregard these errors.

## 2022-03-30 NOTE — PLAN OF CARE
[facility]  Initial Discharge Assessment       Primary Care Provider: CAITLYN Avila    Admission Diagnosis: Cellulitis, unspecified cellulitis site [L03.90]    Admission Date: 3/29/2022  Expected Discharge Date:     Discharge Barriers Identified: (P) None  Assessment completed at bedside. Patient does not have a POA or AD. Patient lives with his spouse, Karine Townsend, [phone] and plans to return home upon discharge. Patient does not use any DME at home and is not requesting any upon discharge.       Payor: /     Extended Emergency Contact Information  Primary Emergency Contact: [email or web address]  Mobile Phone: [phone]  Relation: Spouse  Preferred language: English   needed? No    Discharge Plan A: (P) Home  Discharge Plan B: (P) Home      [facility] [place] [address]   [address] [place] [address]  Phone: [phone] Fax: [phone]      Initial Assessment (most recent)       Adult Discharge Assessment - 03/30/22 1106          Discharge Assessment    Assessment Type Discharge Planning Assessment (P)      Confirmed/corrected address, phone number and insurance Yes (P)      Confirmed Demographics Correct on Facesheet (P)      Source of Information patient (P)      When was your last doctors appointment? -- (P)    sometime in January    Communicated BRET with patient/caregiver Date not available/Unable to determine (P)      Lives With spouse (P)      Facility Arrived From: home (P)      Do you expect to return to your current living situation? Yes (P)      Do you have help at home or someone to help you manage your care at home? Yes (P)      Who are your caregiver(s) and their phone number(s)? Kairne Townsend, [phone], spouse (P)      Prior to hospitilization cognitive status: Alert/Oriented (P)      Current cognitive status: Alert/Oriented (P)      Walking or Climbing Stairs Difficulty none (P)      Dressing/Bathing Difficulty none (P)      Home  Accessibility wheelchair accessible (P)      Home Layout Able to live on 1st floor (P)      Equipment Currently Used at Home none (P)      Readmission within 30 days? No (P)      Patient currently being followed by outpatient case management? No (P)      Do you currently have service(s) that help you manage your care at home? No (P)      Do you take prescription medications? Yes (P)      Do you have prescription coverage? Yes (P)      Coverage self-pay (P)      Do you have any problems affording any of your prescribed medications? No (P)      Is the patient taking medications as prescribed? yes (P)      Who is going to help you get home at discharge? Karine Cary, [phone], spouse (P)      How do you get to doctors appointments? car, drives self (P)      Are you on dialysis? No (P)      Do you take coumadin? No (P)      Discharge Plan A Home (P)      Discharge Plan B Home (P)      DME Needed Upon Discharge  none (P)      Discharge Plan discussed with: Patient (P)      Discharge Barriers Identified None (P)

## 2022-03-31 LAB
ANION GAP SERPL CALC-SCNC: 9 MMOL/L (ref 8–16)
BASOPHILS # BLD AUTO: 0.01 K/UL (ref 0–0.2)
BASOPHILS NFR BLD: 0.1 % (ref 0–1.9)
BUN SERPL-MCNC: 17 MG/DL (ref 6–20)
CALCIUM SERPL-MCNC: 8.1 MG/DL (ref 8.7–10.5)
CHLORIDE SERPL-SCNC: 101 MMOL/L (ref 95–110)
CO2 SERPL-SCNC: 24 MMOL/L (ref 23–29)
CREAT SERPL-MCNC: 1.2 MG/DL (ref 0.5–1.4)
DIFFERENTIAL METHOD: ABNORMAL
EOSINOPHIL # BLD AUTO: 0.1 K/UL (ref 0–0.5)
EOSINOPHIL NFR BLD: 0.7 % (ref 0–8)
ERYTHROCYTE [DISTWIDTH] IN BLOOD BY AUTOMATED COUNT: 12.5 % (ref 11.5–14.5)
EST. GFR  (AFRICAN AMERICAN): >60 ML/MIN/1.73 M^2
EST. GFR  (NON AFRICAN AMERICAN): >60 ML/MIN/1.73 M^2
GLUCOSE SERPL-MCNC: 107 MG/DL (ref 70–110)
HCT VFR BLD AUTO: 40.8 % (ref 40–54)
HGB BLD-MCNC: 12.7 G/DL (ref 14–18)
IMM GRANULOCYTES # BLD AUTO: 0.07 K/UL (ref 0–0.04)
IMM GRANULOCYTES NFR BLD AUTO: 0.8 % (ref 0–0.5)
LYMPHOCYTES # BLD AUTO: 0.6 K/UL (ref 1–4.8)
LYMPHOCYTES NFR BLD: 6.9 % (ref 18–48)
MCH RBC QN AUTO: 27.8 PG (ref 27–31)
MCHC RBC AUTO-ENTMCNC: 31.1 G/DL (ref 32–36)
MCV RBC AUTO: 89 FL (ref 82–98)
MONOCYTES # BLD AUTO: 0.2 K/UL (ref 0.3–1)
MONOCYTES NFR BLD: 2.5 % (ref 4–15)
NEUTROPHILS # BLD AUTO: 7.4 K/UL (ref 1.8–7.7)
NEUTROPHILS NFR BLD: 89 % (ref 38–73)
NRBC BLD-RTO: 0 /100 WBC
PLATELET # BLD AUTO: 130 K/UL (ref 150–450)
PMV BLD AUTO: 9.5 FL (ref 9.2–12.9)
POTASSIUM SERPL-SCNC: 3.7 MMOL/L (ref 3.5–5.1)
PROCALCITONIN SERPL IA-MCNC: 1.14 NG/ML (ref 0–0.5)
RBC # BLD AUTO: 4.57 M/UL (ref 4.6–6.2)
SODIUM SERPL-SCNC: 134 MMOL/L (ref 136–145)
WBC # BLD AUTO: 8.35 K/UL (ref 3.9–12.7)

## 2022-03-31 PROCEDURE — 63700000 PHARM REV CODE 250 ALT 637 W/O HCPCS: Performed by: INTERNAL MEDICINE

## 2022-03-31 PROCEDURE — 99232 PR SUBSEQUENT HOSPITAL CARE,LEVL II: ICD-10-PCS | Mod: ,,, | Performed by: INTERNAL MEDICINE

## 2022-03-31 PROCEDURE — 25000003 PHARM REV CODE 250: Performed by: NURSE PRACTITIONER

## 2022-03-31 PROCEDURE — 36415 COLL VENOUS BLD VENIPUNCTURE: CPT | Performed by: INTERNAL MEDICINE

## 2022-03-31 PROCEDURE — 25000003 PHARM REV CODE 250: Performed by: INTERNAL MEDICINE

## 2022-03-31 PROCEDURE — 99232 SBSQ HOSP IP/OBS MODERATE 35: CPT | Mod: ,,, | Performed by: INTERNAL MEDICINE

## 2022-03-31 PROCEDURE — 85025 COMPLETE CBC W/AUTO DIFF WBC: CPT | Performed by: NURSE PRACTITIONER

## 2022-03-31 PROCEDURE — 12000002 HC ACUTE/MED SURGE SEMI-PRIVATE ROOM

## 2022-03-31 PROCEDURE — 80048 BASIC METABOLIC PNL TOTAL CA: CPT | Performed by: NURSE PRACTITIONER

## 2022-03-31 PROCEDURE — 84145 PROCALCITONIN (PCT): CPT | Performed by: INTERNAL MEDICINE

## 2022-03-31 RX ORDER — LINEZOLID 600 MG/1
600 TABLET, FILM COATED ORAL EVERY 12 HOURS
Status: DISCONTINUED | OUTPATIENT
Start: 2022-03-31 | End: 2022-04-01 | Stop reason: HOSPADM

## 2022-03-31 RX ADMIN — LINEZOLID 600 MG: 600 TABLET, FILM COATED ORAL at 03:03

## 2022-03-31 RX ADMIN — CHLORHEXIDINE GLUCONATE 15 ML: 1.2 RINSE ORAL at 09:03

## 2022-03-31 RX ADMIN — CHLORHEXIDINE GLUCONATE 15 ML: 1.2 RINSE ORAL at 08:03

## 2022-03-31 RX ADMIN — LINEZOLID 600 MG: 600 TABLET, FILM COATED ORAL at 09:03

## 2022-03-31 RX ADMIN — ATORVASTATIN CALCIUM 40 MG: 40 TABLET, FILM COATED ORAL at 09:03

## 2022-03-31 RX ADMIN — NYSTATIN: 100000 CREAM TOPICAL at 09:03

## 2022-03-31 RX ADMIN — FLUCONAZOLE 400 MG: 100 TABLET ORAL at 08:03

## 2022-03-31 RX ADMIN — CLINDAMYCIN IN 5 PERCENT DEXTROSE 600 MG: 12 INJECTION, SOLUTION INTRAVENOUS at 01:03

## 2022-03-31 RX ADMIN — AMLODIPINE BESYLATE 10 MG: 5 TABLET ORAL at 08:03

## 2022-03-31 RX ADMIN — CLINDAMYCIN IN 5 PERCENT DEXTROSE 600 MG: 12 INJECTION, SOLUTION INTRAVENOUS at 09:03

## 2022-03-31 RX ADMIN — NYSTATIN: 100000 CREAM TOPICAL at 03:03

## 2022-03-31 RX ADMIN — ASPIRIN 81 MG 81 MG: 81 TABLET ORAL at 08:03

## 2022-03-31 RX ADMIN — CLINDAMYCIN IN 5 PERCENT DEXTROSE 600 MG: 12 INJECTION, SOLUTION INTRAVENOUS at 05:03

## 2022-03-31 NOTE — PHYSICIAN QUERY
PT Name: Nithin Townsend  MR #: [id number]     Documentation Clarification      CDS/: Karlene Vazquez               Contact information:[phone] or through epic messenger    This form is a permanent document in the medical record.     Query Date: March 31, 2022    By submitting this query, we are merely seeking further clarification of documentation. Please utilize your independent clinical judgment when addressing the question(s) below.    The Medical Record reflects the following:    Supporting Clinical Findings Location in Medical Record   Lactic Acid 2.0 on 3/29 @1210 down to 1.4 (WNL) @ 1759 on 3/29     Labs EPIC     Lactic Acid, Plasma  -Abnormal    Lactic acid blood increased ER Prov Note      Hospitalist's PNs 3/30 and 3/31                                                                            Provider, please provide diagnosis or diagnoses associated with above clinical findings.    [    ]    [  x  ] Lactic Acidosis    Insignificant finding - Its borderline high   [    ] Other (please specify):

## 2022-03-31 NOTE — PROGRESS NOTES
[facility] Medicine  Progress Note    Patient name: Nithin Townsend  MRN: [id number]  Admit Date: 3/29/2022   LOS: 2 days     SUBJECTIVE:     Principal problem: Cellulitis    Interval History:  Patient was febrile last night with a T-max of 102.9° F. Reports improvement in right groin redness however right medial calf erythema is slightly worse.      Scheduled Meds:   amLODIPine  10 mg Oral Daily    aspirin  81 mg Oral Daily    atorvastatin  40 mg Oral QHS    chlorhexidine  15 mL Mouth/Throat BID    clindamycin (CLEOCIN) IVPB  600 mg Intravenous Q8H    enoxparin  40 mg Subcutaneous Q24H    fluconazole  400 mg Oral Daily    linezolid  600 mg Oral Q12H    nystatin   Topical (Top) BID     Continuous Infusions:  PRN Meds:acetaminophen, calcium chloride IVPB, calcium chloride IVPB, calcium chloride IVPB, calcium chloride IVPB, hydrALAZINE, HYDROcodone-acetaminophen, magnesium oxide, magnesium sulfate IVPB, magnesium sulfate IVPB, magnesium sulfate IVPB, magnesium sulfate IVPB, melatonin, ondansetron, potassium chloride, potassium chloride, potassium chloride, potassium chloride, sodium chloride 0.9%    Review of patient's allergies indicates:  No Known Allergies    Review of Systems: As per interval history    OBJECTIVE:     Vital Signs (Most Recent)  Temp: 98.1 °F (36.7 °C) (03/31/22 0750)  Pulse: (!) 56 (03/31/22 0750)  Resp: 18 (03/31/22 0750)  BP: (!) 143/74 (03/31/22 0750)  SpO2: 98 % (03/31/22 0750)    Vital Signs Range (Last 24H):  Temp:  [97.8 °F (36.6 °C)-102.9 °F (39.4 °C)]   Pulse:  [56-76]   Resp:  [16-18]   BP: (143-198)/(72-88)   SpO2:  [96 %-98 %]     I & O (Last 24H):    Intake/Output Summary (Last 24 hours) at 3/31/2022 1227  Last data filed at 3/31/2022 0534  Gross per 24 hour   Intake 240 ml   Output 875 ml   Net -635 ml       Physical Exam:  General: Patient resting comfortably in no acute distress. Appears as stated age. Calm  Eyes: No conjunctival injection. No scleral  icterus.  ENT: Hearing grossly intact. No discharge from ears. No nasal discharge.   CVS: RRR. No LE edema BL  Lungs:  No tachypnea or accessory muscle use.  Clear to auscultation bilaterally  Abdomen:  Soft, nontender and nondistended.  No organomegaly  Neuro: AOx3. Moves all extremities. Follows commands. Responds appropriately   Skin:  Right groin erythema with moisture associated dermatitis. Chronic venous stasis dermatitis. Right leg erythema noted.     Laboratory:  I have reviewed all pertinent lab results within the past 24 hours.  CBC:   Recent Labs   Lab 03/31/22  0841   WBC 8.35   RBC 4.57*   HGB 12.7*   HCT 40.8   *   MCV 89   MCH 27.8   MCHC 31.1*     CMP:   Recent Labs   Lab 03/29/22  1210 03/30/22  0519 03/31/22  0841      < > 107   CALCIUM 8.1*   < > 8.1*   ALBUMIN 3.2*  --   --    PROT 7.0  --   --    *   < > 134*   K 3.8   < > 3.7   CO2 28   < > 24   CL 96   < > 101   BUN 15   < > 17   CREATININE 1.3   < > 1.2   ALKPHOS 73  --   --    ALT 10  --   --    AST 17  --   --    BILITOT 0.9  --   --     < > = values in this interval not displayed.     Microbiology Results (last 7 days)     Procedure Component Value Units Date/Time    Blood culture x two cultures. Draw prior to antibiotics. [[id number]] Collected: 03/29/22 1250    Order Status: Completed Specimen: Blood from Peripheral, Antecubital, Left Updated: 03/30/22 1432     Blood Culture, Routine No Growth to date      No Growth to date    Narrative:      Aerobic and anaerobic    Blood culture x two cultures. Draw prior to antibiotics. [[id number]] Collected: 03/29/22 1210    Order Status: Completed Specimen: Blood from Peripheral, Antecubital, Right Updated: 03/30/22 1432     Blood Culture, Routine No Growth to date      No Growth to date    Narrative:      Aerobic and anaerobic          Diagnostic Results:  Labs: Reviewed    ASSESSMENT/PLAN:     Active Hospital Problems    Diagnosis  POA    *Cellulitis [L03.90]  Yes    Candidal  intertrigo [B37.2]  Yes    Folliculitis [L73.9]  Yes    Class 2 obesity without serious comorbidity with body mass index (BMI) of 38.0 to 38.9 in adult [E66.9, Z68.38]  Not Applicable    HTN (hypertension) [I10]  Yes    Severe obesity (BMI >= 40) [E66.01]  Yes    Thrombocytopenia [D69.6]  Yes    Lactic acid blood increased [R79.89]  Yes      Resolved Hospital Problems   No resolved problems to display.     Plan:   Continue clindamycin for non-purulent cellulitis of right lower extremity  Also started linezolid 600 mg b.i.d.  Monitor fever curve and cultures  Reviewed prior cultures from December 2021 from right thigh cellulitis; grew MSSA and E coli  Fluconazole for candidal intertrigo in addition to topical nystatin  Appreciate input from ID and General surgery    Continue home medications for chronic medical conditions including amlodipine and statin   Hydralazine p.r.n. for hypertensive urgency    VTE Risk Mitigation (From admission, onward)         Ordered     enoxaparin injection 40 mg  Every 24 hours (non-standard times)         03/29/22 2345     IP VTE LOW RISK PATIENT  Once         03/29/22 1630                    Department Hospital Medicine  [facility]  Skip Mccray MD  Date of service: 03/31/2022        Please note: This note was transcribed using voice recognition software. Because of this technology, there are often uinintended grammatical, spelling, and other transcription errors. Please disregard these errors.

## 2022-03-31 NOTE — PROGRESS NOTES
Consult Note  Infectious Disease    Reason for Consult:  Cellulitis right leg and Candida intertrigo, possible abscess    HPI: Nithin Townsend is a 56 y.o. male Presented to the emergency room yesterday for redness of his abdomen and weeping fluid, redness right thigh, history of infected right thigh hematoma, with Staph aureus and E coli, 12/2021, requiring surgical drainage.  Since that time he was seen for ED visit for knee pain 2/28/22, left knee joint effusion on US and xray.  Yesterday was found have a temperature of 99.1°, erythema and foul odor to the in for abdominal folds and groin consistent with candidiasis, and redness of the right leg worrisome for cellulitis.  CT scan of the abdomen and pelvis was negative for acute finding.  White blood cells were normal, lactic acid was normal.  He was admitted to the Hospital Medicine Service and placed on clindamycin.  He was seen by General surgery, Dr. Street would perform his previous I and D because of a question of a small abscess in the right proximal thigh but clinically there was nothing to drain.  He is also receiving nystatin cream to bilateral groin regions.  Urinalysis yesterday had greater than 100 red blood cells per high-power field.    3/31: interim reviewed. tmax 102.9 which he relates to the room being warm and him being under the covers.. WBC remain normal.  The cellulitis of his right lower leg is definitely better but the medial calf is slightly hemorrhagic.  He also has some redness proximal to the knee medially that I did not notice yesterday along with a patch of redness mid anterior thigh. The intensity of inflammation in groin yeast infection is much improved. I still cannot palpate or visualize an abscess. He has no other complaints on ROS.    EXAM & DIAGNOSTICS REVIEWED:   Vitals:     Temp:  [97.3 °F (36.3 °C)-102.9 °F (39.4 °C)]   Temp: 98.1 °F (36.7 °C) (03/31/22 0750)  Pulse: (!) 56 (03/31/22 0750)  Resp: 18 (03/31/22 0750)  BP: (!)  143/74 (03/31/22 0750)  SpO2: 98 % (03/31/22 0750)    Intake/Output Summary (Last 24 hours) at 3/31/2022 1053  Last data filed at 3/31/2022 0534  Gross per 24 hour   Intake 240 ml   Output 875 ml   Net -635 ml       General:  In NAD. Alert and attentive, cooperative, comfortable  Eyes:  Anicteric,   EOMI  ENT:  No ulcers, exudates, thrush, nares patent,    Neck:  Supple,   Lungs: Clear, no consolidation, rales, wheezes, rub  Heart:  RRR, no gallop/murmur/rub noted  Abd:  Soft, obese, NT, ND, normal BS, no masses or organomegaly appreciated.  :  Voids/, there is some involvement of foreskin with candida  Musc:  Joints without effusion, swelling, erythema, synovitis, muscle wasting.   Skin:  Improving but still moderate to severe bilateral groin candidiasis    Superimposed staph folliculitis on abdomen, groin, medial thighs,     Cellulitis right lower extremity,  Is improved from the proximal tibia down. Proximal tibia has some hemorrhage in the skin which is not unexpected. There is redness proximal to knee medially along saphenous vein path and on anterior thigh, also perhaps with a little phlebitis   I do not appreciate any abscess    Neuro:             Alert, attentive, speech fluent, face symmetric, moves all extremities, no focal weakness. Ambulatory  Psych: Calm, cooperative     Extrem: Chronic LE edema with brown staining of venous stasis BLE. Right leg has confluent erythema around medial foot, ankle and lower leg, becoming patchy near the knee. No lymphangitis,  warm and well perfused. Moderate small varicosities about both lower legs.  VAD:       Isolation:    Wound: Scar right medial thigh from 12/2021 surgery.      3/31: definitely better, see above description    General Labs reviewed:  Recent Labs   Lab 03/29/22  1210 03/30/22  0519 03/31/22  0841   WBC 10.47 10.14 8.35   HGB 13.5* 11.8* 12.7*   HCT 41.6 36.1* 40.8   * 113* 130*       Recent Labs   Lab 03/29/22  1210 03/30/22  0519  03/31/22  0841   * 131* 134*   K 3.8 3.8 3.7   CL 96 97 101   CO2 28 26 24   BUN 15 16 17   CREATININE 1.3 1.2 1.2   CALCIUM 8.1* 7.7* 8.1*   PROT 7.0  --   --    BILITOT 0.9  --   --    ALKPHOS 73  --   --    ALT 10  --   --    AST 17  --   --      Recent Labs   Lab 03/29/22  1759   CRP 25.21*         Micro:  Microbiology Results (last 7 days)     Procedure Component Value Units Date/Time    Blood culture x two cultures. Draw prior to antibiotics. [[id number]] Collected: 03/29/22 1250    Order Status: Completed Specimen: Blood from Peripheral, Antecubital, Left Updated: 03/30/22 1432     Blood Culture, Routine No Growth to date      No Growth to date    Narrative:      Aerobic and anaerobic    Blood culture x two cultures. Draw prior to antibiotics. [[id number]] Collected: 03/29/22 1210    Order Status: Completed Specimen: Blood from Peripheral, Antecubital, Right Updated: 03/30/22 1432     Blood Culture, Routine No Growth to date      No Growth to date    Narrative:      Aerobic and anaerobic          Imaging Reviewed:   Ultrasound  FINDINGS: Color and duplex Doppler sonographic assessment with spectral analysis of the right lower extremity demonstrates no evidence of deep vein thrombosis. Normal color Doppler flow, augmentation, and compressibility are noted at all levels.     Prominent lymph node at the right groin measures 2.8 x 1.2 x 3.2 cm. In a region of reported erythema at the right groin, there is a subcutaneous branching linear hypoechoic region which measures 4.9 x 1.6 cm. This is nonspecific, but could potentially reflect small abscess or hematoma. Correlate clinically.     IMPRESSION:  1. No evidence of DVT in the right lower extremity.  2. Small complex fluid collection at the right groin as described, in a region of reported erythema. Correlate clinically for possible small hematoma or abscess. Mildly enlarged right inguinal lymph node is probably benign/reactive.     CT scan of the abdomen and  pelvis 3/29 was negative    Cardiology:    IMPRESSION & PLAN   1. Cellulitis right leg, c/w Staph or Strep (presentation is more streptococcal) but he does have typical Staph folliculitis in groin   Improved 3/31  2. Severe Candida intertrigo, bilateral groin, medial thigh, and some in genital skin, improved  3. Morbid obesity  4. Venous stasis at baseline      Recommendations:   adding po zyvox to clindamycin  If fever persists, will repeat imaging to look for evolution of abscess, with contrasted CT     Continue nystatin cream bid, and oral diflucan  Bid showers with hibiclens, pat dry, apply nystatin, and separate skin folds with soft cloth(pillowcase) or ABD pads  Intranasal bactroban and chlorhexidine mouthwash as part of decolonization effort    D/w patient means to reduce relapse of candida infection    Medical Decision Making during this encounter was  [_] Low Complexity  [_] Moderate Complexity  [xx  ] High Complexity

## 2022-04-01 VITALS
RESPIRATION RATE: 18 BRPM | OXYGEN SATURATION: 99 % | TEMPERATURE: 99 F | HEIGHT: 75 IN | HEART RATE: 72 BPM | WEIGHT: 307.5 LBS | DIASTOLIC BLOOD PRESSURE: 93 MMHG | BODY MASS INDEX: 38.23 KG/M2 | SYSTOLIC BLOOD PRESSURE: 181 MMHG

## 2022-04-01 PROBLEM — R79.89 LACTIC ACID BLOOD INCREASED: Status: RESOLVED | Noted: 2022-03-29 | Resolved: 2022-04-01

## 2022-04-01 LAB
ANION GAP SERPL CALC-SCNC: 10 MMOL/L (ref 8–16)
BASOPHILS # BLD AUTO: 0.02 K/UL (ref 0–0.2)
BASOPHILS NFR BLD: 0.3 % (ref 0–1.9)
BUN SERPL-MCNC: 15 MG/DL (ref 6–20)
CALCIUM SERPL-MCNC: 7.7 MG/DL (ref 8.7–10.5)
CHLORIDE SERPL-SCNC: 102 MMOL/L (ref 95–110)
CO2 SERPL-SCNC: 23 MMOL/L (ref 23–29)
CREAT SERPL-MCNC: 1.1 MG/DL (ref 0.5–1.4)
DIFFERENTIAL METHOD: ABNORMAL
EOSINOPHIL # BLD AUTO: 0.1 K/UL (ref 0–0.5)
EOSINOPHIL NFR BLD: 1.8 % (ref 0–8)
ERYTHROCYTE [DISTWIDTH] IN BLOOD BY AUTOMATED COUNT: 12.5 % (ref 11.5–14.5)
EST. GFR  (AFRICAN AMERICAN): >60 ML/MIN/1.73 M^2
EST. GFR  (NON AFRICAN AMERICAN): >60 ML/MIN/1.73 M^2
GLUCOSE SERPL-MCNC: 91 MG/DL (ref 70–110)
HCT VFR BLD AUTO: 37.5 % (ref 40–54)
HGB BLD-MCNC: 12.5 G/DL (ref 14–18)
IMM GRANULOCYTES # BLD AUTO: 0.06 K/UL (ref 0–0.04)
IMM GRANULOCYTES NFR BLD AUTO: 0.8 % (ref 0–0.5)
LYMPHOCYTES # BLD AUTO: 0.8 K/UL (ref 1–4.8)
LYMPHOCYTES NFR BLD: 10.1 % (ref 18–48)
MCH RBC QN AUTO: 28.1 PG (ref 27–31)
MCHC RBC AUTO-ENTMCNC: 33.3 G/DL (ref 32–36)
MCV RBC AUTO: 84 FL (ref 82–98)
MONOCYTES # BLD AUTO: 0.5 K/UL (ref 0.3–1)
MONOCYTES NFR BLD: 6.1 % (ref 4–15)
NEUTROPHILS # BLD AUTO: 6.3 K/UL (ref 1.8–7.7)
NEUTROPHILS NFR BLD: 80.9 % (ref 38–73)
NRBC BLD-RTO: 0 /100 WBC
PLATELET # BLD AUTO: 135 K/UL (ref 150–450)
PMV BLD AUTO: 9.2 FL (ref 9.2–12.9)
POTASSIUM SERPL-SCNC: 3.5 MMOL/L (ref 3.5–5.1)
RBC # BLD AUTO: 4.45 M/UL (ref 4.6–6.2)
SODIUM SERPL-SCNC: 135 MMOL/L (ref 136–145)
WBC # BLD AUTO: 7.72 K/UL (ref 3.9–12.7)

## 2022-04-01 PROCEDURE — 80048 BASIC METABOLIC PNL TOTAL CA: CPT | Performed by: NURSE PRACTITIONER

## 2022-04-01 PROCEDURE — 85025 COMPLETE CBC W/AUTO DIFF WBC: CPT | Performed by: NURSE PRACTITIONER

## 2022-04-01 PROCEDURE — 25000003 PHARM REV CODE 250: Performed by: INTERNAL MEDICINE

## 2022-04-01 PROCEDURE — 99231 PR SUBSEQUENT HOSPITAL CARE,LEVL I: ICD-10-PCS | Mod: ,,, | Performed by: INTERNAL MEDICINE

## 2022-04-01 PROCEDURE — 63600175 PHARM REV CODE 636 W HCPCS: Performed by: NURSE PRACTITIONER

## 2022-04-01 PROCEDURE — 25000003 PHARM REV CODE 250: Performed by: NURSE PRACTITIONER

## 2022-04-01 PROCEDURE — 63700000 PHARM REV CODE 250 ALT 637 W/O HCPCS: Performed by: INTERNAL MEDICINE

## 2022-04-01 PROCEDURE — 36415 COLL VENOUS BLD VENIPUNCTURE: CPT | Performed by: NURSE PRACTITIONER

## 2022-04-01 PROCEDURE — 99231 SBSQ HOSP IP/OBS SF/LOW 25: CPT | Mod: ,,, | Performed by: INTERNAL MEDICINE

## 2022-04-01 RX ORDER — LINEZOLID 600 MG/1
600 TABLET, FILM COATED ORAL EVERY 12 HOURS
Qty: 14 TABLET | Refills: 0 | Status: SHIPPED | OUTPATIENT
Start: 2022-04-01 | End: 2022-04-08

## 2022-04-01 RX ORDER — NYSTATIN 100000 U/G
CREAM TOPICAL 2 TIMES DAILY
Qty: 30 G | Refills: 2 | Status: SHIPPED | OUTPATIENT
Start: 2022-04-01

## 2022-04-01 RX ORDER — FLUCONAZOLE 200 MG/1
200 TABLET ORAL DAILY
Qty: 7 TABLET | Refills: 0 | Status: SHIPPED | OUTPATIENT
Start: 2022-04-01 | End: 2022-04-08

## 2022-04-01 RX ORDER — CHLORHEXIDINE GLUCONATE 40 MG/ML
SOLUTION TOPICAL 2 TIMES DAILY
Refills: 0
Start: 2022-04-01

## 2022-04-01 RX ADMIN — POTASSIUM CHLORIDE 40 MEQ: 20 TABLET, EXTENDED RELEASE ORAL at 08:04

## 2022-04-01 RX ADMIN — CHLORHEXIDINE GLUCONATE 15 ML: 1.2 RINSE ORAL at 08:04

## 2022-04-01 RX ADMIN — ENOXAPARIN SODIUM 40 MG: 40 INJECTION SUBCUTANEOUS at 12:04

## 2022-04-01 RX ADMIN — ASPIRIN 81 MG 81 MG: 81 TABLET ORAL at 08:04

## 2022-04-01 RX ADMIN — CLINDAMYCIN IN 5 PERCENT DEXTROSE 600 MG: 12 INJECTION, SOLUTION INTRAVENOUS at 01:04

## 2022-04-01 RX ADMIN — CLINDAMYCIN IN 5 PERCENT DEXTROSE 600 MG: 12 INJECTION, SOLUTION INTRAVENOUS at 05:04

## 2022-04-01 RX ADMIN — AMLODIPINE BESYLATE 10 MG: 5 TABLET ORAL at 08:04

## 2022-04-01 RX ADMIN — LINEZOLID 600 MG: 600 TABLET, FILM COATED ORAL at 08:04

## 2022-04-01 RX ADMIN — FLUCONAZOLE 400 MG: 100 TABLET ORAL at 08:04

## 2022-04-01 RX ADMIN — NYSTATIN: 100000 CREAM TOPICAL at 08:04

## 2022-04-01 NOTE — DISCHARGE SUMMARY
[facility]  Discharge Summary      Patient Name: Nithin Townsend  MRN: [id number]  Patient Class: IP- Inpatient  Admission Date: 3/29/2022  Hospital Length of Stay: 3 days  Discharge Date and Time: 4/1/2022  6:09 PM  Attending Physician: No att. providers found   Discharging Provider: Skip Mccray MD  Primary Care Provider: CAITLYN vAila    Procedure(s) (LRB):  INCISION AND DRAINAGE, ABSCESS (Right)      Hospital Course:   Patient is a 56-year-old  male with morbid obesity, chronic venous stasis and recent infected right thigh hematoma with MSSA and E coli in December 2021 requiring surgical drainage admitted for right thigh/calf cellulitis.  Also found to have candidal intertrigo.    Patient evaluated by Infectious Diseases.  Right lower extremity cellulitis consistent with Staph versus Strep and also diagnosed to have folliculitis in the groin most likely secondary to Staphylococcus.  He was initially on clindamycin and was later transitioned to linezolid.  In addition he also received fluconazole and topical nystatin for severe candidal intertrigo.  He had improvement in symptoms and was discharged on linezolid and fluconazole.  Discussed discharge instructions including showers with chlorhexidine and maintaining personal hygiene and other measures to prevent candidal infection.    Physical exam on the day of discharge:  General: Patient resting comfortably in no acute distress. Obese  Lungs: CTA. Good air entry.  Cor: Regular rate and rhythm. No murmurs. No pedal edema.  Abd: Soft. Nontender. Non-distended.  Neuro: A&O x3. Moving all 4 extremities equally  Skin: B/l groin cellulitis (R>L) with folliculitis. Right calf erythema mostly over the medial knee aspect. Chronic bilateral venous stasis dermatitis          Goals of Care Treatment Preferences:  Code Status: Full Code      Consults:   Consults (From admission, onward)        Status Ordering Provider      Infectious Disease  Once        Provider:  Hallie Radford MD    Completed ROXANNE GANDARA     Inpatient consult to General surgery  Once        Provider:  Chandler Street MD    Completed NAVIN OLEA     Inpatient consult to Internal Medicine  Once        Provider:  Roxanne Gandara NP    Acknowledged NAVIN OLEA          No new Assessment & Plan notes have been filed under this hospital service since the last note was generated.  Service: Hospital Medicine    Final Active Diagnoses:    Diagnosis Date Noted POA    PRINCIPAL PROBLEM:  Cellulitis [L03.90] 03/29/2022 Yes    Candidal intertrigo [B37.2]  Yes    Folliculitis [L73.9]  Yes    Class 2 obesity without serious comorbidity with body mass index (BMI) of 38.0 to 38.9 in adult [E66.9, Z68.38]  Not Applicable    HTN (hypertension) [I10] 03/29/2022 Yes    Severe obesity (BMI >= 40) [E66.01] 03/29/2022 Yes    Thrombocytopenia [D69.6] 03/29/2022 Yes      Problems Resolved During this Admission:    Diagnosis Date Noted Date Resolved POA    Lactic acid blood increased [R79.89] 03/29/2022 04/01/2022 Yes       Discharged Condition: stable    Disposition: Home or Self Care    Follow Up:   Follow-up Information     CAITLYN Avila Follow up in 4 week(s).    Specialty: Family Medicine  Why: As needed  Contact information:  [address] [place] [id number] [phone]                       Patient Instructions:      Diet Cardiac     Notify your health care provider if you experience any of the following:  temperature >100.4     Notify your health care provider if you experience any of the following:  severe uncontrolled pain     Notify your health care provider if you experience any of the following:  persistent nausea and vomiting or diarrhea     No dressing needed   Order Comments: Separate skin folds with soft cloth or cut-up cotton T-shirt  Shower twice daily with Hibiclens (Chlorhexidine), pat dry, apply nystatin     Activity as  tolerated       Significant Diagnostic Studies: Labs:   CMP   Recent Labs   Lab 03/31/22  0841 04/01/22  0519   * 135*   K 3.7 3.5    102   CO2 24 23    91   BUN 17 15   CREATININE 1.2 1.1   CALCIUM 8.1* 7.7*   ANIONGAP 9 10   ESTGFRAFRICA >60.0 >60.0   EGFRNONAA >60.0 >60.0    and CBC   Recent Labs   Lab 03/31/22  0841 04/01/22  0519   WBC 8.35 7.72   HGB 12.7* 12.5*   HCT 40.8 37.5*   * 135*     Medications:  Reconciled Home Medications:      Medication List      START taking these medications    chlorhexidine 4 % external liquid  Commonly known as: HIBICLENS  Apply topically 2 (two) times a day. Shower twice daily with Chlorhexidine, pat dry and apply nystatin to groin     fluconazole 200 MG Tab  Commonly known as: DIFLUCAN  Take 1 tablet (200 mg total) by mouth once daily for 7 days     linezolid 600 mg Tab  Commonly known as: ZYVOX  Take 1 tablet (600 mg total) by mouth every 12 (twelve) hours. for 7 days     mupirocin calcium 2% nasl oint 2 % Oint  Commonly known as: BACTROBAN  by Nasal route 2 (two) times daily. for 14 days     nystatin cream  Commonly known as: MYCOSTATIN  Apply topically 2 (two) times daily.        CONTINUE taking these medications    amLODIPine 10 MG tablet  Commonly known as: NORVASC  Take 1 tablet (10 mg total) by mouth once daily.     aspirin 81 MG Chew  Take 1 tablet (81 mg total) by mouth once daily.     atorvastatin 40 MG tablet  Commonly known as: LIPITOR  Take 1 tablet (40 mg total) by mouth every evening.     HYDROcodone-acetaminophen 5-325 mg per tablet  Commonly known as: NORCO  Take 1 tablet by mouth every 4 (four) hours as needed for Pain.              Time spent on the discharge of patient: 40 minutes         Skip Mccray MD  [facility]

## 2022-04-01 NOTE — HOSPITAL COURSE
Patient is a 56-year-old  male with morbid obesity, chronic venous stasis and recent infected right thigh hematoma with MSSA and E coli in December 2021 requiring surgical drainage admitted for right thigh/calf cellulitis.  Also found to have candidal intertrigo.    Patient evaluated by Infectious Diseases.  Right lower extremity cellulitis consistent with Staph versus Strep and also diagnosed to have folliculitis in the groin most likely secondary to Staphylococcus.  He was initially on clindamycin and was later transitioned to linezolid.  In addition he also received fluconazole and topical nystatin for severe candidal intertrigo.  He had improvement in symptoms and was discharged on linezolid and fluconazole.  Discussed discharge instructions including showers with chlorhexidine and maintaining personal hygiene and other measures to prevent candidal infection.    Physical exam on the day of discharge:  General: Patient resting comfortably in no acute distress. Obese  Lungs: CTA. Good air entry.  Cor: Regular rate and rhythm. No murmurs. No pedal edema.  Abd: Soft. Nontender. Non-distended.  Neuro: A&O x3. Moving all 4 extremities equally  Skin: B/l groin cellulitis (R>L) with folliculitis. Right calf erythema mostly over the medial knee aspect. Chronic bilateral venous stasis dermatitis

## 2022-04-01 NOTE — PLAN OF CARE
04/01/22 1610   Final Note   Assessment Type Final Discharge Note   Anticipated Discharge Disposition Home   Orders reviewed - pt discharging home this date with no case management needs noted.

## 2022-04-01 NOTE — NURSING
Pt discharged in stable condition with spouse. All medications brought to pt by pharmacy. Discharge instructions reviewed, verbalized understanding.

## 2022-04-01 NOTE — PLAN OF CARE
Problem: Adult Inpatient Plan of Care  Goal: Plan of Care Review  Outcome: Adequate for Care Transition  Goal: Patient-Specific Goal (Individualized)  Outcome: Adequate for Care Transition  Goal: Absence of Hospital-Acquired Illness or Injury  Outcome: Adequate for Care Transition  Goal: Optimal Comfort and Wellbeing  Outcome: Adequate for Care Transition  Goal: Readiness for Transition of Care  Outcome: Adequate for Care Transition     Problem: Bariatric Environmental Safety  Goal: Safety Maintained with Care  Outcome: Adequate for Care Transition

## 2022-04-01 NOTE — PROGRESS NOTES
Consult Note  Infectious Disease    Reason for Consult:  Cellulitis right leg and Candida intertrigo, possible abscess    HPI: Nithin Townsend is a 56 y.o. male Presented to the emergency room yesterday for redness of his abdomen and weeping fluid, redness right thigh, history of infected right thigh hematoma, with Staph aureus and E coli, 12/2021, requiring surgical drainage.  Since that time he was seen for ED visit for knee pain 2/28/22, left knee joint effusion on US and xray.  Yesterday was found have a temperature of 99.1°, erythema and foul odor to the in for abdominal folds and groin consistent with candidiasis, and redness of the right leg worrisome for cellulitis.  CT scan of the abdomen and pelvis was negative for acute finding.  White blood cells were normal, lactic acid was normal.  He was admitted to the Hospital Medicine Service and placed on clindamycin.  He was seen by General surgery, Dr. Street would perform his previous I and D because of a question of a small abscess in the right proximal thigh but clinically there was nothing to drain.  He is also receiving nystatin cream to bilateral groin regions.  Urinalysis yesterday had greater than 100 red blood cells per high-power field.    3/31: interim reviewed. tmax 102.9 which he relates to the room being warm and him being under the covers.. WBC remain normal.  The cellulitis of his right lower leg is definitely better but the medial calf is slightly hemorrhagic.  He also has some redness proximal to the knee medially that I did not notice yesterday along with a patch of redness mid anterior thigh. The intensity of inflammation in groin yeast infection is much improved. I still cannot palpate or visualize an abscess. He has no other complaints on ROS.  4/1: no further fever. Wbc normal. He desires discharge. RLE redness persists, swelling has decreased. Candidiasis is a little better. He is unsure if he can afford the linezolid.     EXAM & DIAGNOSTICS  REVIEWED:   Vitals:     Temp:  [97.9 °F (36.6 °C)-99.2 °F (37.3 °C)]   Temp: 99.1 °F (37.3 °C) (04/01/22 1117)  Pulse: 68 (04/01/22 1117)  Resp: 18 (04/01/22 1117)  BP: (!) 166/87 (04/01/22 1117)  SpO2: 96 % (04/01/22 1117)    Intake/Output Summary (Last 24 hours) at 4/1/2022 1200  Last data filed at 4/1/2022 1100  Gross per 24 hour   Intake 880 ml   Output 3000 ml   Net -2120 ml       General:  In NAD. Alert and attentive, cooperative, comfortable  Eyes:  Anicteric,   EOMI  ENT:  No ulcers, exudates, thrush, nares patent,    Neck:  Supple,   Lungs:    Heart:     Abd:  Soft, obese, NT, ND,    :  Voids/, there is improved involvement of foreskin with candida  Musc:  Joints without effusion, swelling, erythema, synovitis, muscle wasting.   Skin:  Improving but still moderate   bilateral groin candidiasis    Superimposed staph folliculitis on abdomen, groin, medial thighs, much     Cellulitis right lower extremity,  Is improved from the proximal tibia down. Proximal tibia has some hemorrhage in the skin which is not unexpected. There is redness proximal to knee medially along saphenous vein path and on anterior thigh, also perhaps with a little phlebitis. Less swelling, same distribution of redness   I do not appreciate any abscess  In the groin  Neuro:             Alert, attentive, speech fluent, face symmetric, moves all extremities, no focal weakness. Ambulatory  Psych: Calm, cooperative     Extrem: Chronic LE edema with brown staining of venous stasis BLE.    warm and well perfused. Moderate small varicosities about both lower legs.  VAD:       Isolation:    Wound: Scar right medial thigh from 12/2021 surgery.      3/31: definitely better, see above description    General Labs reviewed:  Recent Labs   Lab 03/30/22  0519 03/31/22  0841 04/01/22  0519   WBC 10.14 8.35 7.72   HGB 11.8* 12.7* 12.5*   HCT 36.1* 40.8 37.5*   * 130* 135*       Recent Labs   Lab 03/29/22  1210 03/30/22  0519 03/31/22  0841  04/01/22  0519   * 131* 134* 135*   K 3.8 3.8 3.7 3.5   CL 96 97 101 102   CO2 28 26 24 23   BUN 15 16 17 15   CREATININE 1.3 1.2 1.2 1.1   CALCIUM 8.1* 7.7* 8.1* 7.7*   PROT 7.0  --   --   --    BILITOT 0.9  --   --   --    ALKPHOS 73  --   --   --    ALT 10  --   --   --    AST 17  --   --   --      Recent Labs   Lab 03/29/22  1759   CRP 25.21*         Micro:  Microbiology Results (last 7 days)     Procedure Component Value Units Date/Time    Blood culture x two cultures. Draw prior to antibiotics. [[id number]] Collected: 03/29/22 1250    Order Status: Completed Specimen: Blood from Peripheral, Antecubital, Left Updated: 03/31/22 1432     Blood Culture, Routine No Growth to date      No Growth to date      No Growth to date    Narrative:      Aerobic and anaerobic    Blood culture x two cultures. Draw prior to antibiotics. [[id number]] Collected: 03/29/22 1210    Order Status: Completed Specimen: Blood from Peripheral, Antecubital, Right Updated: 03/31/22 1432     Blood Culture, Routine No Growth to date      No Growth to date      No Growth to date    Narrative:      Aerobic and anaerobic          Imaging Reviewed:   Ultrasound  FINDINGS: Color and duplex Doppler sonographic assessment with spectral analysis of the right lower extremity demonstrates no evidence of deep vein thrombosis. Normal color Doppler flow, augmentation, and compressibility are noted at all levels.     Prominent lymph node at the right groin measures 2.8 x 1.2 x 3.2 cm. In a region of reported erythema at the right groin, there is a subcutaneous branching linear hypoechoic region which measures 4.9 x 1.6 cm. This is nonspecific, but could potentially reflect small abscess or hematoma. Correlate clinically.     IMPRESSION:  1. No evidence of DVT in the right lower extremity.  2. Small complex fluid collection at the right groin as described, in a region of reported erythema. Correlate clinically for possible small hematoma or abscess.  Mildly enlarged right inguinal lymph node is probably benign/reactive.     CT scan of the abdomen and pelvis 3/29 was negative    Cardiology:    IMPRESSION & PLAN   1. Cellulitis right leg, c/w Staph or Strep (presentation is more streptococcal) but he does have typical Staph folliculitis in groin   Improved 3/31  2. Severe Candida intertrigo, bilateral groin, medial thigh, and some in genital skin, improved  3. Morbid obesity  4. Venous stasis at baseline      Recommendations:  Ok for discharge on a week of linezolid, a week of diflucan and topical antifungal until resolved  If he can afford the linezolid. If not, would keep him one more day and follow with a week of doxy and keflex     Bid showers with hibiclens, pat dry, apply nystatin, and separate skin folds with soft cloth(pillowcase) or cut up cotton T shirt  Intranasal bactroban for 2 weeks as well     D/w patient means to reduce relapse of candida infection, controlling moisture etc  D/w dr. morrison    Medical Decision Making during this encounter was  [_] Low Complexity  [_] Moderate Complexity  [xx  ] High Complexity

## 2022-04-03 LAB
BACTERIA BLD CULT: NORMAL
BACTERIA BLD CULT: NORMAL

## (undated) DEVICE — BANDAGE ACE STERILE 4 REB3114

## (undated) DEVICE — SOLUTION NACL 0.9% 3000ML

## (undated) DEVICE — PULSAEVAC 0210-158-000

## (undated) DEVICE — NEEDLE SAFETY ECLIPSE 25G 1-1/2IN 305767

## (undated) DEVICE — SPONGE GAUZE 10S 4X4  442214

## (undated) DEVICE — BANDAGE KERLIX   441106

## (undated) DEVICE — DRAPE LAPAROTOMY 72X100X124 89228

## (undated) DEVICE — SOLUTION PREP IODINE 4OZ

## (undated) DEVICE — UNDERGLOVE BIOGEL PI MICRO BLUE SZ 8

## (undated) DEVICE — TUBE CULTURE AMIES 220117

## (undated) DEVICE — SOLUTION IRRI NS BOTTLE 1000ML R5200-01

## (undated) DEVICE — TRAY GENERAL SURGERY

## (undated) DEVICE — GLOVE BIOGEL PI ULTRA TOUCH GRAY SZ7.5

## (undated) DEVICE — PAD BOVIE ADULT